# Patient Record
Sex: FEMALE | Race: WHITE | NOT HISPANIC OR LATINO | Employment: OTHER | URBAN - METROPOLITAN AREA
[De-identification: names, ages, dates, MRNs, and addresses within clinical notes are randomized per-mention and may not be internally consistent; named-entity substitution may affect disease eponyms.]

---

## 2017-05-31 ENCOUNTER — APPOINTMENT (OUTPATIENT)
Dept: LAB | Facility: HOSPITAL | Age: 62
End: 2017-05-31

## 2017-05-31 ENCOUNTER — OFFICE VISIT (OUTPATIENT)
Dept: NEUROLOGY | Facility: CLINIC | Age: 62
End: 2017-05-31

## 2017-05-31 VITALS
SYSTOLIC BLOOD PRESSURE: 136 MMHG | OXYGEN SATURATION: 98 % | WEIGHT: 149 LBS | BODY MASS INDEX: 27.42 KG/M2 | HEART RATE: 70 BPM | HEIGHT: 62 IN | DIASTOLIC BLOOD PRESSURE: 74 MMHG

## 2017-05-31 DIAGNOSIS — F41.9 ANXIETY: ICD-10-CM

## 2017-05-31 DIAGNOSIS — G35 MULTIPLE SCLEROSIS (HCC): Primary | ICD-10-CM

## 2017-05-31 LAB
ALBUMIN SERPL-MCNC: 4.5 G/DL (ref 3.2–4.8)
ALBUMIN/GLOB SERPL: 1.6 G/DL (ref 1.5–2.5)
ALP SERPL-CCNC: 70 U/L (ref 25–100)
ALT SERPL W P-5'-P-CCNC: 122 U/L (ref 7–40)
ANION GAP SERPL CALCULATED.3IONS-SCNC: 5 MMOL/L (ref 3–11)
AST SERPL-CCNC: 94 U/L (ref 0–33)
BASOPHILS # BLD AUTO: 0.01 10*3/MM3 (ref 0–0.2)
BASOPHILS NFR BLD AUTO: 0.2 % (ref 0–1)
BILIRUB SERPL-MCNC: 0.4 MG/DL (ref 0.3–1.2)
BUN BLD-MCNC: 11 MG/DL (ref 9–23)
BUN/CREAT SERPL: 18.3 (ref 7–25)
CALCIUM SPEC-SCNC: 10.4 MG/DL (ref 8.7–10.4)
CHLORIDE SERPL-SCNC: 100 MMOL/L (ref 99–109)
CO2 SERPL-SCNC: 35 MMOL/L (ref 20–31)
CREAT BLD-MCNC: 0.6 MG/DL (ref 0.6–1.3)
DEPRECATED RDW RBC AUTO: 47.3 FL (ref 37–54)
EOSINOPHIL # BLD AUTO: 0.12 10*3/MM3 (ref 0.1–0.3)
EOSINOPHIL NFR BLD AUTO: 2.8 % (ref 0–3)
ERYTHROCYTE [DISTWIDTH] IN BLOOD BY AUTOMATED COUNT: 13.8 % (ref 11.3–14.5)
GFR SERPL CREATININE-BSD FRML MDRD: 102 ML/MIN/1.73
GLOBULIN UR ELPH-MCNC: 2.9 GM/DL
GLUCOSE BLD-MCNC: 88 MG/DL (ref 70–100)
HCT VFR BLD AUTO: 39.2 % (ref 34.5–44)
HGB BLD-MCNC: 12.8 G/DL (ref 11.5–15.5)
IMM GRANULOCYTES # BLD: 0 10*3/MM3 (ref 0–0.03)
IMM GRANULOCYTES NFR BLD: 0 % (ref 0–0.6)
LYMPHOCYTES # BLD AUTO: 1.31 10*3/MM3 (ref 0.6–4.8)
LYMPHOCYTES NFR BLD AUTO: 30 % (ref 24–44)
MCH RBC QN AUTO: 30.7 PG (ref 27–31)
MCHC RBC AUTO-ENTMCNC: 32.7 G/DL (ref 32–36)
MCV RBC AUTO: 94 FL (ref 80–99)
MONOCYTES # BLD AUTO: 0.68 10*3/MM3 (ref 0–1)
MONOCYTES NFR BLD AUTO: 15.6 % (ref 0–12)
NEUTROPHILS # BLD AUTO: 2.24 10*3/MM3 (ref 1.5–8.3)
NEUTROPHILS NFR BLD AUTO: 51.4 % (ref 41–71)
PLATELET # BLD AUTO: 223 10*3/MM3 (ref 150–450)
PMV BLD AUTO: 10 FL (ref 6–12)
POTASSIUM BLD-SCNC: 4 MMOL/L (ref 3.5–5.5)
PROT SERPL-MCNC: 7.4 G/DL (ref 5.7–8.2)
RBC # BLD AUTO: 4.17 10*6/MM3 (ref 3.89–5.14)
SODIUM BLD-SCNC: 140 MMOL/L (ref 132–146)
TSH SERPL DL<=0.05 MIU/L-ACNC: 2.22 MIU/ML (ref 0.35–5.35)
WBC NRBC COR # BLD: 4.36 10*3/MM3 (ref 3.5–10.8)

## 2017-05-31 PROCEDURE — 99213 OFFICE O/P EST LOW 20 MIN: CPT | Performed by: PSYCHIATRY & NEUROLOGY

## 2017-05-31 PROCEDURE — 84443 ASSAY THYROID STIM HORMONE: CPT | Performed by: PSYCHIATRY & NEUROLOGY

## 2017-05-31 PROCEDURE — 80053 COMPREHEN METABOLIC PANEL: CPT | Performed by: PSYCHIATRY & NEUROLOGY

## 2017-05-31 PROCEDURE — 85025 COMPLETE CBC W/AUTO DIFF WBC: CPT | Performed by: PSYCHIATRY & NEUROLOGY

## 2017-05-31 PROCEDURE — 36415 COLL VENOUS BLD VENIPUNCTURE: CPT | Performed by: PSYCHIATRY & NEUROLOGY

## 2017-05-31 RX ORDER — LISINOPRIL AND HYDROCHLOROTHIAZIDE 25; 20 MG/1; MG/1
1 TABLET ORAL DAILY
COMMUNITY
Start: 2017-04-14 | End: 2017-06-07

## 2017-05-31 RX ORDER — LEVOTHYROXINE SODIUM 0.03 MG/1
1 TABLET ORAL DAILY
COMMUNITY
Start: 2017-04-28 | End: 2017-09-11

## 2017-05-31 RX ORDER — METOPROLOL SUCCINATE 50 MG/1
1 TABLET, EXTENDED RELEASE ORAL DAILY
COMMUNITY
Start: 2017-04-27

## 2017-06-07 ENCOUNTER — CONSULT (OUTPATIENT)
Dept: CARDIOLOGY | Facility: CLINIC | Age: 62
End: 2017-06-07

## 2017-06-07 VITALS
WEIGHT: 150.3 LBS | HEIGHT: 62 IN | SYSTOLIC BLOOD PRESSURE: 140 MMHG | DIASTOLIC BLOOD PRESSURE: 86 MMHG | HEART RATE: 75 BPM | BODY MASS INDEX: 27.66 KG/M2

## 2017-06-07 DIAGNOSIS — F41.9 ANXIETY: ICD-10-CM

## 2017-06-07 DIAGNOSIS — G35 MULTIPLE SCLEROSIS (HCC): ICD-10-CM

## 2017-06-07 DIAGNOSIS — I20.9 ANGINA PECTORIS (HCC): Primary | ICD-10-CM

## 2017-06-07 DIAGNOSIS — I10 ESSENTIAL HYPERTENSION: ICD-10-CM

## 2017-06-07 DIAGNOSIS — I49.3 PVC (PREMATURE VENTRICULAR CONTRACTION): ICD-10-CM

## 2017-06-07 DIAGNOSIS — Q21.12 PFO (PATENT FORAMEN OVALE): ICD-10-CM

## 2017-06-07 PROCEDURE — 93000 ELECTROCARDIOGRAM COMPLETE: CPT | Performed by: INTERNAL MEDICINE

## 2017-06-07 PROCEDURE — 99213 OFFICE O/P EST LOW 20 MIN: CPT | Performed by: INTERNAL MEDICINE

## 2017-06-07 RX ORDER — LISINOPRIL AND HYDROCHLOROTHIAZIDE 12.5; 1 MG/1; MG/1
1 TABLET ORAL DAILY
COMMUNITY
End: 2019-04-12 | Stop reason: SDUPTHER

## 2017-06-07 NOTE — PROGRESS NOTES
Subjective:     Encounter Date:2017      Patient ID: Jagruti Morgan is a 62 y.o. female.    Chief Complaint:Rapid Heart Rate (Consult); Irregular Heart Beat; Hypertension; and Dizziness    PROBLEM LIST:  1. PFO  2. PVC's  3. Hypertension  4. Dyslipidemia  5. Multiple sclerosis  6. Elevated LFT's  7. Hypothyroidism  8. Stroke left eye  9. Anxiety/depression  10. Surgeries:  a.  section        No Known Allergies      Current Outpatient Prescriptions:   •  aspirin 81 MG tablet, Take 81 mg by mouth Daily., Disp: , Rfl:   •  Calcium-Magnesium-Vitamin D (CALCIUM MAGNESIUM PO), Take  by mouth Daily., Disp: , Rfl:   •  levothyroxine (SYNTHROID, LEVOTHROID) 25 MCG tablet, Take 1 tablet by mouth Daily., Disp: , Rfl:   •  lisinopril-hydrochlorothiazide (PRINZIDE,ZESTORETIC) 10-12.5 MG per tablet, Take 1 tablet by mouth Daily., Disp: , Rfl:   •  metoprolol succinate XL (TOPROL-XL) 50 MG 24 hr tablet, Take 1 tablet by mouth Daily., Disp: , Rfl:   •  MILK THISTLE PO, Take  by mouth Daily., Disp: , Rfl:   •  Multiple Vitamins-Minerals (MULTIVITAMIN ADULTS 50+ PO), Take  by mouth Daily., Disp: , Rfl:   •  NIACIN PO, Take  by mouth Daily., Disp: , Rfl:   •  Omega-3 Fatty Acids (FISH OIL PO), Take  by mouth Daily., Disp: , Rfl:   •  pravastatin (PRAVACHOL) 40 MG tablet, Take 40 mg by mouth daily., Disp: , Rfl:         History of Present Illness    Patient is a 18-year-old  female who we are seeing today for further evaluation of palpitations.  Per her report she has a history of PFO, however she was unable to find records regarding this.  Notes that it was found on a [facility] whenever she had a stroke to her left eye a few years ago.  She is also over the last couple of months been having some palpitations.  She initially noted this when ever she was in [place].  Part onset had noted elevated blood pressure with associated nosebleed.  Contacted her primary care physician who increased her lisinopril.  She continued  "to have symptoms and then presented back to [place] for further evaluation.  She had a Holter monitor performed which showed PVCs that were associated with her symptoms.  She was then started on a higher dose of her metoprolol.  Since that time her symptoms have been improved but not resolved.  Her hard pounding sensation palpitations are now replaced by a \"flutters\".  Denies any chest pain, pressure, tightness.  Denies any increasing shortness of breath.  Denies any syncope, near-syncope.  Does have some occasional dizziness with her palpitations.  She also wonders if some of her dizziness is related to the increase in her medications.  She has not had an ischemic evaluation in roughly 5 years.  She was told by her physicians to \"take it easy\" and therefore has been too scared to exercise.  She recently had some abnormal lab results regarding her thyroid and liver test.  Therefore her extavia was discontinued.  Notes she has been fairly active recently and weight recently went golfing without any symptoms.  He was told she had PFO at [facility] many years ago.  Hematocrit.  Now with labile blood pressures anxiety.  Reduced.  She is taken off her Advair from for MS due to fear of her PVCs.  Exertional symptoms  The following portions of the patient's history were reviewed and updated as appropriate: allergies, current medications, past family history, past medical history, past social history, past surgical history and problem list.    Family History   Problem Relation Age of Onset   • Alzheimer's disease Mother    • Dementia Mother       from alziehmier's   • Diabetes Mother    • Hypertension Father    • Heart attack Father    • Stroke Other    • Parkinsonism Paternal Uncle      Dianoised at 80   • Stroke Maternal Aunt    • Cancer Brother      bladder cancer       Social History   Substance Use Topics   • Smoking status: Former Smoker   • Smokeless tobacco: Never Used      Comment: quit 23 years ago   • Alcohol use " "Yes      Comment: occas         Review of Systems   Constitution: Negative for fever, weakness and malaise/fatigue.   HENT: Negative for headaches and nosebleeds.    Eyes: Negative for redness and visual disturbance.   Cardiovascular: Negative for orthopnea, palpitations and paroxysmal nocturnal dyspnea.   Respiratory: Negative for cough, snoring, sputum production and wheezing.    Hematologic/Lymphatic: Negative for bleeding problem.   Skin: Negative for flushing, itching and rash.   Musculoskeletal: Negative for falls, joint pain and muscle cramps.   Gastrointestinal: Negative for abdominal pain, diarrhea, heartburn, nausea and vomiting.   Genitourinary: Negative for hematuria.   Neurological: Negative for excessive daytime sleepiness, dizziness and tremors.   Psychiatric/Behavioral: Positive for depression. Negative for substance abuse. The patient is nervous/anxious.           Objective:    height is 62\" (157.5 cm) and weight is 150 lb 4.8 oz (68.2 kg). Her blood pressure is 140/86 and her pulse is 75.         Physical Exam   Constitutional: She is oriented to person, place, and time. She appears well-developed and well-nourished.   HENT:   Head: Normocephalic and atraumatic.   Mouth/Throat: Oropharynx is clear and moist.   Eyes: Conjunctivae are normal. Pupils are equal, round, and reactive to light.   Neck: Normal carotid pulses and no JVD present. Carotid bruit is not present. No thyromegaly present.   Cardiovascular: Normal rate, regular rhythm, S1 normal and S2 normal.  Exam reveals no gallop and no friction rub.    No murmur heard.  Pulses:       Carotid pulses are 2+ on the right side, and 2+ on the left side.       Dorsalis pedis pulses are 2+ on the right side, and 2+ on the left side.        Posterior tibial pulses are 2+ on the right side, and 2+ on the left side.   Pulmonary/Chest: No respiratory distress. She has no wheezes. She has no rales. She exhibits no tenderness.   Abdominal: She exhibits no " distension, no abdominal bruit and no mass. There is no hepatosplenomegaly. There is no tenderness. There is no rebound.   Musculoskeletal: She exhibits no edema, tenderness or deformity.   Lymphadenopathy:     She has no cervical adenopathy.   Neurological: She is alert and oriented to person, place, and time. She has normal strength.   Skin: Skin is warm and dry. No rash noted. No cyanosis. Nails show no clubbing.   Psychiatric: She has a normal mood and affect. Cognition and memory are normal.         ECG 12 Lead  Date/Time: 6/7/2017 10:36 AM  Performed by: ROHIT WARNER  Authorized by: ROHIT WARNER   Rhythm: sinus rhythm  Clinical impression: normal ECG          Sinus rhythm normal limits        Assessment:   Assessment/Plan      Jagruti was seen today for rapid heart rate, irregular heart beat, hypertension and dizziness.    Diagnoses and all orders for this visit:    Angina pectoris  -     Adult Transthoracic Echo Complete; Future  -     Stress Test With Myocardial Perfusion; Future    PFO (patent foramen ovale)  -     Adult Transthoracic Echo Complete; Future    PVC (premature ventricular contraction)    Anxiety    Essential hypertension    Multiple sclerosis    Other orders  -     ECG 12 Lead      Labile hypertension likely anxiety reduced.  Now well controlled on current medical therapy  PVCs, plus minus symptomatic.  PFO asymptomatic    Discussed benign nature PVCs as long she is instruction normal heart.  Her last evaluation was 4 years ago therefore we'll repeat a stress perfusion study and an echocardiogram at her earliest convenience.  She understands if these tests are normal, and she has PVCs which showed normal heart, this is a benign condition.  There is no need to curtail her activities.  Proceed with any therapy for her MS as as needed, we'll simply treat her PVCs symptomatically.  At this time, her symptoms have improved enough, that if benign, she does not wish to have any further up  titration of her medical therapy.       Do ROSARIO scribed portions of this dictation for  Dr. yanna LALA, Lionel Ruiz MD, personally performed the services described in this documentation as scribed by the above individual in my presence, and it is both accurate and complete      Dictated utilizing Dragon dictation

## 2017-06-21 ENCOUNTER — HOSPITAL ENCOUNTER (OUTPATIENT)
Dept: CARDIOLOGY | Facility: HOSPITAL | Age: 62
Discharge: HOME | End: 2017-06-21
Attending: INTERNAL MEDICINE | Admitting: INTERNAL MEDICINE

## 2017-06-21 VITALS — BODY MASS INDEX: 27.6 KG/M2 | WEIGHT: 150 LBS | HEIGHT: 62 IN

## 2017-06-21 DIAGNOSIS — Q21.12 PFO (PATENT FORAMEN OVALE): ICD-10-CM

## 2017-06-21 DIAGNOSIS — I20.9 ANGINA PECTORIS (HCC): ICD-10-CM

## 2017-06-21 PROCEDURE — 93306 TTE W/DOPPLER COMPLETE: CPT | Performed by: INTERNAL MEDICINE

## 2017-06-21 PROCEDURE — 93306 TTE W/DOPPLER COMPLETE: CPT

## 2017-06-22 LAB
BH CV ECHO MEAS - AO ROOT AREA (BSA CORRECTED): 1.5
BH CV ECHO MEAS - AO ROOT AREA: 5.3 CM^2
BH CV ECHO MEAS - AO ROOT DIAM: 2.6 CM
BH CV ECHO MEAS - BSA(HAYCOCK): 1.7 M^2
BH CV ECHO MEAS - BSA: 1.7 M^2
BH CV ECHO MEAS - BZI_BMI: 27.4 KILOGRAMS/M^2
BH CV ECHO MEAS - BZI_METRIC_HEIGHT: 157.5 CM
BH CV ECHO MEAS - BZI_METRIC_WEIGHT: 68 KG
BH CV ECHO MEAS - CONTRAST EF (2CH): 67.8 ML/M^2
BH CV ECHO MEAS - CONTRAST EF 4CH: 65.6 ML/M^2
BH CV ECHO MEAS - EDV(CUBED): 54.9 ML
BH CV ECHO MEAS - EDV(MOD-SP2): 59 ML
BH CV ECHO MEAS - EDV(MOD-SP4): 64 ML
BH CV ECHO MEAS - EDV(TEICH): 62 ML
BH CV ECHO MEAS - EF(CUBED): 74.8 %
BH CV ECHO MEAS - EF(MOD-SP2): 67.8 %
BH CV ECHO MEAS - EF(MOD-SP4): 65.6 %
BH CV ECHO MEAS - EF(TEICH): 67.5 %
BH CV ECHO MEAS - ESV(CUBED): 13.8 ML
BH CV ECHO MEAS - ESV(MOD-SP2): 19 ML
BH CV ECHO MEAS - ESV(MOD-SP4): 22 ML
BH CV ECHO MEAS - ESV(TEICH): 20.2 ML
BH CV ECHO MEAS - FS: 36.8 %
BH CV ECHO MEAS - IVS/LVPW: 1
BH CV ECHO MEAS - IVSD: 1 CM
BH CV ECHO MEAS - LA DIMENSION: 3.5 CM
BH CV ECHO MEAS - LA/AO: 1.3
BH CV ECHO MEAS - LAT PEAK E' VEL: 12 CM/SEC
BH CV ECHO MEAS - LV DIASTOLIC VOL/BSA (35-75): 37.8 ML/M^2
BH CV ECHO MEAS - LV MASS(C)D: 117.3 GRAMS
BH CV ECHO MEAS - LV MASS(C)DI: 69.3 GRAMS/M^2
BH CV ECHO MEAS - LV SYSTOLIC VOL/BSA (12-30): 13 ML/M^2
BH CV ECHO MEAS - LVIDD: 3.8 CM
BH CV ECHO MEAS - LVIDS: 2.4 CM
BH CV ECHO MEAS - LVLD AP2: 6.7 CM
BH CV ECHO MEAS - LVLD AP4: 7.3 CM
BH CV ECHO MEAS - LVLS AP2: 5 CM
BH CV ECHO MEAS - LVLS AP4: 5.4 CM
BH CV ECHO MEAS - LVPWD: 1 CM
BH CV ECHO MEAS - MED PEAK E' VEL: 6.53 CM/SEC
BH CV ECHO MEAS - MV A MAX VEL: 73.3 CM/SEC
BH CV ECHO MEAS - MV DEC TIME: 0.22 SEC
BH CV ECHO MEAS - MV E MAX VEL: 77.1 CM/SEC
BH CV ECHO MEAS - MV E/A: 1.1
BH CV ECHO MEAS - PA ACC SLOPE: 385 CM/SEC^2
BH CV ECHO MEAS - PA ACC TIME: 0.14 SEC
BH CV ECHO MEAS - PA PR(ACCEL): 17.4 MMHG
BH CV ECHO MEAS - PI END-D VEL: 89 CM/SEC
BH CV ECHO MEAS - RAP SYSTOLE: 3 MMHG
BH CV ECHO MEAS - RVDD: 2.6 CM
BH CV ECHO MEAS - RVSP: 25.1 MMHG
BH CV ECHO MEAS - SI(CUBED): 24.3 ML/M^2
BH CV ECHO MEAS - SI(MOD-SP2): 23.6 ML/M^2
BH CV ECHO MEAS - SI(MOD-SP4): 24.8 ML/M^2
BH CV ECHO MEAS - SI(TEICH): 24.7 ML/M^2
BH CV ECHO MEAS - SV(CUBED): 41 ML
BH CV ECHO MEAS - SV(MOD-SP2): 40 ML
BH CV ECHO MEAS - SV(MOD-SP4): 42 ML
BH CV ECHO MEAS - SV(TEICH): 41.8 ML
BH CV ECHO MEAS - TAPSE (>1.6): 1.2 CM2
BH CV ECHO MEAS - TR MAX VEL: 235 CM/SEC
BH CV VAS BP RIGHT ARM: NORMAL MMHG
BH CV XLRA - RV BASE: 3.1 CM
BH CV XLRA - RV LENGTH: 6.1 CM
BH CV XLRA - RV MID: 2.4 CM
BH CV XLRA - TDI S': 13.4 CM/SEC
E/E' RATIO: 14.1
LEFT ATRIUM VOLUME INDEX: 26 ML/M2
LEFT ATRIUM VOLUME: 44 CM3
LV EF 2D ECHO EST: 60 %

## 2017-06-28 ENCOUNTER — OUTSIDE FACILITY SERVICE (OUTPATIENT)
Dept: CARDIOLOGY | Facility: CLINIC | Age: 62
End: 2017-06-28

## 2017-06-28 PROCEDURE — 93018 CV STRESS TEST I&R ONLY: CPT | Performed by: INTERNAL MEDICINE

## 2017-06-29 ENCOUNTER — TELEPHONE (OUTPATIENT)
Dept: CARDIOLOGY | Facility: CLINIC | Age: 62
End: 2017-06-29

## 2017-06-29 NOTE — TELEPHONE ENCOUNTER
----- Message from MARLENE Mendez sent at 6/28/2017  1:29 PM EDT -----  Please let patient know that her echo from last week was normal.

## 2017-07-31 ENCOUNTER — OFFICE VISIT (OUTPATIENT)
Dept: NEUROLOGY | Facility: CLINIC | Age: 62
End: 2017-07-31

## 2017-07-31 ENCOUNTER — LAB (OUTPATIENT)
Dept: LAB | Facility: HOSPITAL | Age: 62
End: 2017-07-31

## 2017-07-31 VITALS
SYSTOLIC BLOOD PRESSURE: 120 MMHG | HEIGHT: 62 IN | WEIGHT: 150.4 LBS | DIASTOLIC BLOOD PRESSURE: 80 MMHG | OXYGEN SATURATION: 99 % | HEART RATE: 77 BPM | BODY MASS INDEX: 27.68 KG/M2

## 2017-07-31 DIAGNOSIS — F33.41 RECURRENT MAJOR DEPRESSIVE DISORDER, IN PARTIAL REMISSION (HCC): ICD-10-CM

## 2017-07-31 DIAGNOSIS — G35 MULTIPLE SCLEROSIS (HCC): Primary | ICD-10-CM

## 2017-07-31 DIAGNOSIS — G35 MULTIPLE SCLEROSIS (HCC): ICD-10-CM

## 2017-07-31 LAB
ALBUMIN SERPL-MCNC: 4.8 G/DL (ref 3.2–4.8)
ALBUMIN/GLOB SERPL: 1.5 G/DL (ref 1.5–2.5)
ALP SERPL-CCNC: 71 U/L (ref 25–100)
ALT SERPL W P-5'-P-CCNC: 75 U/L (ref 7–40)
ANION GAP SERPL CALCULATED.3IONS-SCNC: 6 MMOL/L (ref 3–11)
AST SERPL-CCNC: 51 U/L (ref 0–33)
BASOPHILS # BLD AUTO: 0.03 10*3/MM3 (ref 0–0.2)
BASOPHILS NFR BLD AUTO: 0.7 % (ref 0–1)
BILIRUB SERPL-MCNC: 0.4 MG/DL (ref 0.3–1.2)
BUN BLD-MCNC: 12 MG/DL (ref 9–23)
BUN/CREAT SERPL: 20 (ref 7–25)
CALCIUM SPEC-SCNC: 10.7 MG/DL (ref 8.7–10.4)
CHLORIDE SERPL-SCNC: 102 MMOL/L (ref 99–109)
CO2 SERPL-SCNC: 32 MMOL/L (ref 20–31)
CREAT BLD-MCNC: 0.6 MG/DL (ref 0.6–1.3)
DEPRECATED RDW RBC AUTO: 45.2 FL (ref 37–54)
EOSINOPHIL # BLD AUTO: 0.14 10*3/MM3 (ref 0–0.3)
EOSINOPHIL NFR BLD AUTO: 3.1 % (ref 0–3)
ERYTHROCYTE [DISTWIDTH] IN BLOOD BY AUTOMATED COUNT: 13.1 % (ref 11.3–14.5)
GFR SERPL CREATININE-BSD FRML MDRD: 102 ML/MIN/1.73
GLOBULIN UR ELPH-MCNC: 3.1 GM/DL
GLUCOSE BLD-MCNC: 89 MG/DL (ref 70–100)
HCT VFR BLD AUTO: 42.2 % (ref 34.5–44)
HGB BLD-MCNC: 14.1 G/DL (ref 11.5–15.5)
IMM GRANULOCYTES # BLD: 0 10*3/MM3 (ref 0–0.03)
IMM GRANULOCYTES NFR BLD: 0 % (ref 0–0.6)
LYMPHOCYTES # BLD AUTO: 1.75 10*3/MM3 (ref 0.6–4.8)
LYMPHOCYTES NFR BLD AUTO: 38.2 % (ref 24–44)
MCH RBC QN AUTO: 31.3 PG (ref 27–31)
MCHC RBC AUTO-ENTMCNC: 33.4 G/DL (ref 32–36)
MCV RBC AUTO: 93.6 FL (ref 80–99)
MONOCYTES # BLD AUTO: 0.54 10*3/MM3 (ref 0–1)
MONOCYTES NFR BLD AUTO: 11.8 % (ref 0–12)
NEUTROPHILS # BLD AUTO: 2.12 10*3/MM3 (ref 1.5–8.3)
NEUTROPHILS NFR BLD AUTO: 46.2 % (ref 41–71)
PLATELET # BLD AUTO: 255 10*3/MM3 (ref 150–450)
PMV BLD AUTO: 9.3 FL (ref 6–12)
POTASSIUM BLD-SCNC: 4.4 MMOL/L (ref 3.5–5.5)
PROT SERPL-MCNC: 7.9 G/DL (ref 5.7–8.2)
RBC # BLD AUTO: 4.51 10*6/MM3 (ref 3.89–5.14)
SODIUM BLD-SCNC: 140 MMOL/L (ref 132–146)
TSH SERPL DL<=0.05 MIU/L-ACNC: 2.87 MIU/ML (ref 0.35–5.35)
WBC NRBC COR # BLD: 4.58 10*3/MM3 (ref 3.5–10.8)

## 2017-07-31 PROCEDURE — 85025 COMPLETE CBC W/AUTO DIFF WBC: CPT | Performed by: PSYCHIATRY & NEUROLOGY

## 2017-07-31 PROCEDURE — 80053 COMPREHEN METABOLIC PANEL: CPT | Performed by: PSYCHIATRY & NEUROLOGY

## 2017-07-31 PROCEDURE — 99214 OFFICE O/P EST MOD 30 MIN: CPT | Performed by: PSYCHIATRY & NEUROLOGY

## 2017-07-31 PROCEDURE — 36415 COLL VENOUS BLD VENIPUNCTURE: CPT

## 2017-07-31 PROCEDURE — 84443 ASSAY THYROID STIM HORMONE: CPT | Performed by: PSYCHIATRY & NEUROLOGY

## 2017-07-31 RX ORDER — DICLOFENAC SODIUM 75 MG/1
TABLET, DELAYED RELEASE ORAL
COMMUNITY
Start: 2017-07-28 | End: 2018-04-10 | Stop reason: HOSPADM

## 2017-07-31 RX ORDER — METHOCARBAMOL 500 MG/1
TABLET, FILM COATED ORAL
COMMUNITY
Start: 2017-07-28 | End: 2017-09-11

## 2017-07-31 NOTE — PROGRESS NOTES
"Subjective:     Patient ID: Jagruti Morgan is a 62 y.o. female.    History of Present Illness     62 y.o.  woman with RRMS, fatigue and MDD returns in follow up.  Last visit on 5/31/17 stopped Extavia and ordered labs.       5/31/17:  AST 94    CBC stable  TSH 2.22      Interval history:  Stopping Extavia has had improved fatigue and decreased anxiety.      MDD     Mood improved significantly off Extavia.  Does not bother her to go out in public as before it would.      RRMS    No new or worsening sx.   Fatigue is mild.  Heat intolerance is moderate.         The following portions of the patient's history were reviewed and updated as appropriate: allergies, current medications, past medical history, past surgical history and problem list.    Review of Systems   Constitutional: Positive for fatigue. Negative for activity change and unexpected weight change.   HENT: Negative for tinnitus and trouble swallowing.    Eyes: Negative for photophobia and visual disturbance.   Respiratory: Negative for apnea and choking.    Cardiovascular: Negative for leg swelling.   Endocrine: Positive for heat intolerance. Negative for cold intolerance.   Genitourinary: Negative for difficulty urinating, frequency, menstrual problem and urgency.   Musculoskeletal: Negative for back pain and gait problem.   Skin: Negative for color change.   Allergic/Immunologic: Negative for immunocompromised state.   Neurological: Negative for dizziness, tremors, seizures, syncope, facial asymmetry, speech difficulty and light-headedness.   Hematological: Negative for adenopathy. Does not bruise/bleed easily.   Psychiatric/Behavioral: Positive for decreased concentration and dysphoric mood. Negative for behavioral problems, confusion, hallucinations and sleep disturbance. The patient is nervous/anxious.         Objective:  Vitals:    07/31/17 1120   BP: 120/80   Pulse: 77   SpO2: 99%   Weight: 150 lb 6.4 oz (68.2 kg)   Height: 62\" (157.5 cm) "       Neurologic Exam     Mental Status   Oriented to person, place, and time.   Registration: recalls 3 of 3 objects. Recall at 5 minutes: recalls 3 of 3 objects. Follows 3 step commands.   Attention: normal. Concentration: normal.   Speech: speech is normal   Level of consciousness: alert  Knowledge: good and consistent with education.   Able to name object. Able to read. Able to repeat. Able to write. Normal comprehension.     Cranial Nerves     CN II   Visual fields full to confrontation.   Visual acuity: normal  Right visual field deficit: none  Left visual field deficit: none     CN III, IV, VI   Pupils are equal, round, and reactive to light.  Extraocular motions are normal.   Nystagmus: none   Diplopia: none  Ophthalmoparesis: none  Upgaze: normal  Downgaze: normal  Conjugate gaze: present  Vestibulo-ocular reflex: present    CN V   Facial sensation intact.   Right corneal reflex: normal  Left corneal reflex: normal    CN VII   Right facial weakness: none  Left facial weakness: none    CN VIII   Hearing: intact    CN IX, X   Palate: symmetric  Right gag reflex: normal  Left gag reflex: normal    CN XI   Right sternocleidomastoid strength: normal  Left sternocleidomastoid strength: normal    CN XII   Tongue: not atrophic  Fasciculations: absent  Tongue deviation: none    Motor Exam   Muscle bulk: normal  Overall muscle tone: normal  Right arm tone: normal  Left arm tone: normal  Right leg tone: normal  Left leg tone: normal    Strength   Strength 5/5 throughout.     Sensory Exam   Light touch normal.   Pinprick normal.     Gait, Coordination, and Reflexes     Coordination   Romberg: negative  Finger to nose coordination: normal  Heel to shin coordination: normal  Tandem walking coordination: normal    Tremor   Resting tremor: absent  Intention tremor: absent  Action tremor: absent    Reflexes   Reflexes 2+ except as noted.       Physical Exam   Constitutional: She is oriented to person, place, and time. She  appears well-developed and well-nourished.   Eyes: EOM are normal. Pupils are equal, round, and reactive to light.   Neurological: She is oriented to person, place, and time. She has normal strength. She has a normal Finger-Nose-Finger Test, a normal Heel to Shin Test, a normal Romberg Test and a normal Tandem Gait Test.   Psychiatric: Her speech is normal.   Nursing note and vitals reviewed.      Assessment/Plan:       Problems Addressed this Visit        Nervous and Auditory    Multiple sclerosis - Primary     Improved side effects off Extavia    Start copaxone    CBC, CMP, TSH          Relevant Orders    CBC & Differential    Comprehensive Metabolic Panel    TSH    MRI Brain With & Without Contrast       Other    Depression     Psychological condition is improving with treatment.  Continue current treatment regimen.  Psychological condition  will be reassessed at the next regular appointment.

## 2017-08-09 ENCOUNTER — HOSPITAL ENCOUNTER (OUTPATIENT)
Dept: MRI IMAGING | Facility: HOSPITAL | Age: 62
Discharge: HOME | End: 2017-08-09
Attending: PSYCHIATRY & NEUROLOGY | Admitting: PSYCHIATRY & NEUROLOGY

## 2017-08-09 DIAGNOSIS — G35 MULTIPLE SCLEROSIS (HCC): ICD-10-CM

## 2017-08-09 PROCEDURE — 0 GADOBENATE DIMEGLUMINE 529 MG/ML SOLUTION: Performed by: PSYCHIATRY & NEUROLOGY

## 2017-08-09 PROCEDURE — A9577 INJ MULTIHANCE: HCPCS | Performed by: PSYCHIATRY & NEUROLOGY

## 2017-08-09 PROCEDURE — 70553 MRI BRAIN STEM W/O & W/DYE: CPT

## 2017-08-09 RX ADMIN — GADOBENATE DIMEGLUMINE 13 ML: 529 INJECTION, SOLUTION INTRAVENOUS at 11:30

## 2017-08-24 ENCOUNTER — TELEPHONE (OUTPATIENT)
Dept: NEUROLOGY | Facility: CLINIC | Age: 62
End: 2017-08-24

## 2017-08-24 NOTE — TELEPHONE ENCOUNTER
Adv patient to stop copraxtone per Dr. Dalton and see if symptoms decrease. Adv pateint to give us a call once she is feeling better . Allow a few days  patient understood

## 2017-08-28 ENCOUNTER — TELEPHONE (OUTPATIENT)
Dept: NEUROLOGY | Facility: CLINIC | Age: 62
End: 2017-08-28

## 2017-08-28 NOTE — TELEPHONE ENCOUNTER
----- Message from Taylor STERLING Hailee sent at 8/25/2017  4:29 PM EDT -----  Regarding: restart copaxone  Contact: [phone]  Patient states she is unclear on when to restart copaxone. She has a UTI and she is taking antibiotics.

## 2017-08-28 NOTE — TELEPHONE ENCOUNTER
Called pt and adv her to restart Copaxone and if symptoms return give us a call back. Pt stated understanding

## 2017-09-01 ENCOUNTER — TELEPHONE (OUTPATIENT)
Dept: NEUROLOGY | Facility: CLINIC | Age: 62
End: 2017-09-01

## 2017-09-01 NOTE — TELEPHONE ENCOUNTER
----- Message from Taylor Stephen sent at 9/1/2017 10:24 AM EDT -----  Regarding: BLURRY VISION  Contact: [phone]  Patient states she is having blurry vision and seeing prisms. This started 30 minutes ago.

## 2017-09-11 ENCOUNTER — OFFICE VISIT (OUTPATIENT)
Dept: NEUROLOGY | Facility: CLINIC | Age: 62
End: 2017-09-11

## 2017-09-11 VITALS
OXYGEN SATURATION: 98 % | WEIGHT: 151 LBS | BODY MASS INDEX: 27.79 KG/M2 | SYSTOLIC BLOOD PRESSURE: 124 MMHG | HEIGHT: 62 IN | DIASTOLIC BLOOD PRESSURE: 76 MMHG | HEART RATE: 70 BPM

## 2017-09-11 DIAGNOSIS — F41.9 ANXIETY: ICD-10-CM

## 2017-09-11 DIAGNOSIS — G35 MULTIPLE SCLEROSIS (HCC): Primary | ICD-10-CM

## 2017-09-11 PROCEDURE — 99213 OFFICE O/P EST LOW 20 MIN: CPT | Performed by: PSYCHIATRY & NEUROLOGY

## 2017-09-11 RX ORDER — GLATIRAMER ACETATE 40 MG/ML
INJECTION, SOLUTION SUBCUTANEOUS
COMMUNITY
Start: 2017-08-02 | End: 2018-06-15 | Stop reason: SDUPTHER

## 2017-09-11 RX ORDER — LEVOTHYROXINE SODIUM 0.05 MG/1
TABLET ORAL
COMMUNITY
Start: 2017-08-28 | End: 2020-06-22 | Stop reason: DRUGHIGH

## 2017-09-11 NOTE — PROGRESS NOTES
Subjective:     Patient ID: Jagruti Morgan is a 62 y.o. female.    History of Present Illness     62 y.o.  woman with RRMS, fatigue and MDD returns in follow up.  Last visit on 7/31/17 started Copaxone, ordered MRI Brain, labs.       7/31/17:  AST 51; ALT 75; CBC stable  TSH 2.874    MRI Brain, my review of films, 8/9/17 compared to 1/15/16 moderate to extensive T2 lesion load without new/enhancing/enalrging lesions    MDD     Mood is stable.  Some anxiety about effectiveness of copaxone.      RRMS    Started on Copaxone and some injection site pain.  No new or worsening sx.   Fatigue is mild.  Heat intolerance is moderate.         The following portions of the patient's history were reviewed and updated as appropriate: allergies, current medications, past medical history, past surgical history and problem list.    Review of Systems   Constitutional: Positive for fatigue. Negative for activity change and unexpected weight change.   HENT: Negative for tinnitus and trouble swallowing.    Eyes: Negative for photophobia and visual disturbance.   Respiratory: Negative for apnea and choking.    Cardiovascular: Negative for leg swelling.   Endocrine: Positive for heat intolerance. Negative for cold intolerance.   Genitourinary: Negative for difficulty urinating, frequency, menstrual problem and urgency.   Musculoskeletal: Negative for back pain and gait problem.   Skin: Negative for color change.   Allergic/Immunologic: Negative for immunocompromised state.   Neurological: Negative for dizziness, tremors, seizures, syncope, facial asymmetry, speech difficulty and light-headedness.   Hematological: Negative for adenopathy. Does not bruise/bleed easily.   Psychiatric/Behavioral: Positive for decreased concentration and dysphoric mood. Negative for behavioral problems, confusion, hallucinations and sleep disturbance. The patient is nervous/anxious.         Objective:  Vitals:    09/11/17 1143   BP: 124/76   Pulse: 70  "  SpO2: 98%   Weight: 151 lb (68.5 kg)   Height: 62\" (157.5 cm)       Neurologic Exam     Mental Status   Oriented to person, place, and time.   Registration: recalls 3 of 3 objects. Recall at 5 minutes: recalls 3 of 3 objects. Follows 3 step commands.   Attention: normal. Concentration: normal.   Speech: speech is normal   Level of consciousness: alert  Knowledge: good and consistent with education.   Able to name object. Able to read. Able to repeat. Able to write. Normal comprehension.     Cranial Nerves     CN II   Visual fields full to confrontation.   Visual acuity: normal  Right visual field deficit: none  Left visual field deficit: none     CN III, IV, VI   Pupils are equal, round, and reactive to light.  Extraocular motions are normal.   Nystagmus: none   Diplopia: none  Ophthalmoparesis: none  Upgaze: normal  Downgaze: normal  Conjugate gaze: present  Vestibulo-ocular reflex: present    CN V   Facial sensation intact.   Right corneal reflex: normal  Left corneal reflex: normal    CN VII   Right facial weakness: none  Left facial weakness: none    CN VIII   Hearing: intact    CN IX, X   Palate: symmetric  Right gag reflex: normal  Left gag reflex: normal    CN XI   Right sternocleidomastoid strength: normal  Left sternocleidomastoid strength: normal    CN XII   Tongue: not atrophic  Fasciculations: absent  Tongue deviation: none    Motor Exam   Muscle bulk: normal  Overall muscle tone: normal  Right arm tone: normal  Left arm tone: normal  Right leg tone: normal  Left leg tone: normal    Strength   Strength 5/5 throughout.     Sensory Exam   Light touch normal.   Pinprick normal.     Gait, Coordination, and Reflexes     Coordination   Romberg: negative  Finger to nose coordination: normal  Heel to shin coordination: normal  Tandem walking coordination: normal    Tremor   Resting tremor: absent  Intention tremor: absent  Action tremor: absent    Reflexes   Reflexes 2+ except as noted.       Physical Exam "   Constitutional: She is oriented to person, place, and time. She appears well-developed and well-nourished.   Eyes: EOM are normal. Pupils are equal, round, and reactive to light.   Neurological: She is oriented to person, place, and time. She has normal strength. She has a normal Finger-Nose-Finger Test, a normal Heel to Shin Test, a normal Romberg Test and a normal Tandem Gait Test.   Psychiatric: Her speech is normal.   Nursing note and vitals reviewed.      Assessment/Plan:       Problems Addressed this Visit        Nervous and Auditory    Multiple sclerosis - Primary     Switched to Copaxone with some injection site discomfort  LFT's dereasing              Other    Anxiety     Wishes to stay off medication

## 2018-02-23 ENCOUNTER — TELEPHONE (OUTPATIENT)
Dept: NEUROLOGY | Facility: CLINIC | Age: 63
End: 2018-02-23

## 2018-02-23 NOTE — TELEPHONE ENCOUNTER
Patient called, stated that she gets a bout of dizziness and then shortly after that she has diarrhea. Patient stated that she also gets a severe headache when this happens. Wanted to know if you though it was meniere's disease and what she should do about it. Please Advise. Thanks!!

## 2018-03-20 ENCOUNTER — OFFICE VISIT (OUTPATIENT)
Dept: NEUROLOGY | Facility: CLINIC | Age: 63
End: 2018-03-20

## 2018-03-20 VITALS
OXYGEN SATURATION: 99 % | RESPIRATION RATE: 16 BRPM | DIASTOLIC BLOOD PRESSURE: 88 MMHG | WEIGHT: 145.6 LBS | BODY MASS INDEX: 26.79 KG/M2 | HEART RATE: 78 BPM | HEIGHT: 62 IN | SYSTOLIC BLOOD PRESSURE: 152 MMHG

## 2018-03-20 DIAGNOSIS — F33.41 RECURRENT MAJOR DEPRESSIVE DISORDER, IN PARTIAL REMISSION (HCC): ICD-10-CM

## 2018-03-20 DIAGNOSIS — G35 MULTIPLE SCLEROSIS (HCC): Primary | ICD-10-CM

## 2018-03-20 PROCEDURE — 99213 OFFICE O/P EST LOW 20 MIN: CPT | Performed by: PSYCHIATRY & NEUROLOGY

## 2018-03-20 NOTE — PROGRESS NOTES
Subjective:   Chief Complaint   Patient presents with   • Follow-up     MS       Patient ID: Jagruti Morgan is a 63 y.o. female.    History of Present Illness     63 y.o.  woman with RRMS, fatigue and MDD returns in follow up.  Last visit on 9/11/17 continued Copaxone..          MRI Brain, 8/9/17 compared to 1/15/16 moderate to extensive T2 lesion load without new/enhancing/enalrging lesions    MDD     Mood is stable.  Anxiety improved slightly with change to GA.     RRMS    Last 3 weeks developed pressure/pain in ears.  Ears fell congested.  When arising feels unsteady.  Sinuses feel congested.     Tolerating Copaxone with mild injection site pain for last 7 months.  No new or worsening sx.   Fatigue is mild.  Heat intolerance is moderate.  Exercising daily.         The following portions of the patient's history were reviewed and updated as appropriate: allergies, current medications, past medical history, past surgical history and problem list.    Review of Systems   Constitutional: Positive for fatigue. Negative for activity change and unexpected weight change.   HENT: Negative for tinnitus and trouble swallowing.    Eyes: Negative for photophobia and visual disturbance.   Respiratory: Negative for apnea and choking.    Cardiovascular: Negative for leg swelling.   Endocrine: Positive for heat intolerance. Negative for cold intolerance.   Genitourinary: Negative for difficulty urinating, frequency, menstrual problem and urgency.   Musculoskeletal: Negative for back pain and gait problem.   Skin: Negative for color change.   Allergic/Immunologic: Negative for immunocompromised state.   Neurological: Negative for dizziness, tremors, seizures, syncope, facial asymmetry, speech difficulty and light-headedness.   Hematological: Negative for adenopathy. Does not bruise/bleed easily.   Psychiatric/Behavioral: Positive for decreased concentration and dysphoric mood. Negative for behavioral problems, confusion,  "hallucinations and sleep disturbance. The patient is nervous/anxious.         Objective:  Vitals:    03/20/18 1305   BP: 152/88   Pulse: 78   Resp: 16   SpO2: 99%   Weight: 66 kg (145 lb 9.6 oz)   Height: 157.5 cm (62\")       Neurologic Exam     Mental Status   Oriented to person, place, and time.   Registration: recalls 3 of 3 objects. Recall at 5 minutes: recalls 3 of 3 objects. Follows 3 step commands.   Attention: normal. Concentration: normal.   Speech: speech is normal   Level of consciousness: alert  Knowledge: good and consistent with education.   Able to name object. Able to read. Able to repeat. Able to write. Normal comprehension.     Cranial Nerves     CN II   Visual fields full to confrontation.   Visual acuity: normal  Right visual field deficit: none  Left visual field deficit: none     CN III, IV, VI   Pupils are equal, round, and reactive to light.  Extraocular motions are normal.   Nystagmus: none   Diplopia: none  Ophthalmoparesis: none  Upgaze: normal  Downgaze: normal  Conjugate gaze: present  Vestibulo-ocular reflex: present    CN V   Facial sensation intact.   Right corneal reflex: normal  Left corneal reflex: normal    CN VII   Right facial weakness: none  Left facial weakness: none    CN VIII   Hearing: intact    CN IX, X   Palate: symmetric  Right gag reflex: normal  Left gag reflex: normal    CN XI   Right sternocleidomastoid strength: normal  Left sternocleidomastoid strength: normal    CN XII   Tongue: not atrophic  Fasciculations: absent  Tongue deviation: none    Motor Exam   Muscle bulk: normal  Overall muscle tone: normal  Right arm tone: normal  Left arm tone: normal  Right leg tone: normal  Left leg tone: normal    Strength   Strength 5/5 throughout.     Sensory Exam   Light touch normal.   Pinprick normal.     Gait, Coordination, and Reflexes     Coordination   Romberg: negative  Finger to nose coordination: normal  Heel to shin coordination: normal  Tandem walking coordination: " normal    Tremor   Resting tremor: absent  Intention tremor: absent  Action tremor: absent    Reflexes   Reflexes 2+ except as noted.       Physical Exam   Constitutional: She is oriented to person, place, and time. She appears well-developed and well-nourished.   Eyes: EOM are normal. Pupils are equal, round, and reactive to light.   Neurological: She is oriented to person, place, and time. She has normal strength. She has a normal Finger-Nose-Finger Test, a normal Heel to Shin Test, a normal Romberg Test and a normal Tandem Gait Test.   Psychiatric: Her speech is normal.   Nursing note and vitals reviewed.      Assessment/Plan:       Problems Addressed this Visit        Nervous and Auditory    Multiple sclerosis - Primary     Check MRI Brain 6 months after starting new medication    Continue Copaxone          Relevant Orders    MRI Brain With & Without Contrast       Other    Depression     Psychological condition is improving with lifestyle modifications.  Continue current treatment regimen.  Psychological condition  will be reassessed at the next regular appointment.           Other Visit Diagnoses    None.

## 2018-03-28 ENCOUNTER — HOSPITAL ENCOUNTER (OUTPATIENT)
Dept: MRI IMAGING | Facility: HOSPITAL | Age: 63
Discharge: HOME | End: 2018-03-28
Attending: PSYCHIATRY & NEUROLOGY | Admitting: PSYCHIATRY & NEUROLOGY

## 2018-03-28 DIAGNOSIS — G35 MULTIPLE SCLEROSIS (HCC): ICD-10-CM

## 2018-03-28 LAB — CREAT BLDA-MCNC: 0.6 MG/DL (ref 0.6–1.3)

## 2018-03-28 PROCEDURE — 70553 MRI BRAIN STEM W/O & W/DYE: CPT

## 2018-03-28 PROCEDURE — 0 GADOBENATE DIMEGLUMINE 529 MG/ML SOLUTION: Performed by: PSYCHIATRY & NEUROLOGY

## 2018-03-28 PROCEDURE — 82565 ASSAY OF CREATININE: CPT

## 2018-03-28 PROCEDURE — A9577 INJ MULTIHANCE: HCPCS | Performed by: PSYCHIATRY & NEUROLOGY

## 2018-03-28 RX ADMIN — GADOBENATE DIMEGLUMINE 14 ML: 529 INJECTION, SOLUTION INTRAVENOUS at 09:12

## 2018-04-10 ENCOUNTER — OFFICE VISIT (OUTPATIENT)
Dept: NEUROLOGY | Facility: CLINIC | Age: 63
End: 2018-04-10

## 2018-04-10 VITALS
RESPIRATION RATE: 16 BRPM | HEART RATE: 69 BPM | SYSTOLIC BLOOD PRESSURE: 122 MMHG | HEIGHT: 62 IN | WEIGHT: 152 LBS | OXYGEN SATURATION: 98 % | DIASTOLIC BLOOD PRESSURE: 84 MMHG | BODY MASS INDEX: 27.97 KG/M2

## 2018-04-10 DIAGNOSIS — G35 MULTIPLE SCLEROSIS (HCC): ICD-10-CM

## 2018-04-10 DIAGNOSIS — H81.03 MENIERE'S DISEASE OF BOTH EARS: Primary | ICD-10-CM

## 2018-04-10 DIAGNOSIS — F33.41 RECURRENT MAJOR DEPRESSIVE DISORDER, IN PARTIAL REMISSION (HCC): ICD-10-CM

## 2018-04-10 PROCEDURE — 99213 OFFICE O/P EST LOW 20 MIN: CPT | Performed by: PSYCHIATRY & NEUROLOGY

## 2018-04-10 RX ORDER — IPRATROPIUM BROMIDE 21 UG/1
SPRAY, METERED NASAL
COMMUNITY
Start: 2018-03-29 | End: 2022-05-10 | Stop reason: SDUPTHER

## 2018-04-10 NOTE — PROGRESS NOTES
Subjective:   Chief Complaint   Patient presents with   • Follow-up     MRI Results      RRMS    Patient ID: Jagruti Morgan is a 63 y.o. female.    History of Present Illness     63 y.o.  woman with RRMS, fatigue and MDD returns in follow up.  Last visit on 9/11/17 continued Copaxone..          MRI Brain, 8/9/17 compared to 1/15/16 moderate to extensive T2 lesion load without new/enhancing/enalrging lesions    MDD     Mood is stable.  Anxiety improved slightly with change to GA.     RRMS    Recently diagnosed with Meniere's disease and started on low salt diet.  HA frequency is daily.  Located in top of head.  Quality dull ache.  Mild - moderate intensity.  Sense of of unsteadiness.  Low salt diet improving sx.     Tolerating Copaxone with mild injection pain.  No new or worsening sx.   Fatigue is mild.  Heat intolerance is moderate.  Exercising daily.         The following portions of the patient's history were reviewed and updated as appropriate: allergies, current medications, past medical history, past surgical history and problem list.    Review of Systems   Constitutional: Positive for fatigue. Negative for activity change and unexpected weight change.   HENT: Negative for tinnitus and trouble swallowing.    Eyes: Negative for photophobia and visual disturbance.   Respiratory: Negative for apnea and choking.    Cardiovascular: Negative for leg swelling.   Endocrine: Positive for heat intolerance. Negative for cold intolerance.   Genitourinary: Negative for difficulty urinating, frequency, menstrual problem and urgency.   Musculoskeletal: Negative for back pain and gait problem.   Skin: Negative for color change.   Allergic/Immunologic: Negative for immunocompromised state.   Neurological: Negative for dizziness, tremors, seizures, syncope, facial asymmetry, speech difficulty and light-headedness.   Hematological: Negative for adenopathy. Does not bruise/bleed easily.   Psychiatric/Behavioral: Positive for  "decreased concentration and dysphoric mood. Negative for behavioral problems, confusion, hallucinations and sleep disturbance. The patient is nervous/anxious.         Objective:  Vitals:    04/10/18 1012   BP: 122/84   Pulse: 69   Resp: 16   SpO2: 98%   Weight: 68.9 kg (152 lb)   Height: 157.5 cm (62\")       Neurologic Exam     Mental Status   Oriented to person, place, and time.   Registration: recalls 3 of 3 objects. Recall at 5 minutes: recalls 3 of 3 objects. Follows 3 step commands.   Attention: normal. Concentration: normal.   Speech: speech is normal   Level of consciousness: alert  Knowledge: good and consistent with education.   Able to name object. Able to read. Able to repeat. Able to write. Normal comprehension.     Cranial Nerves     CN II   Visual fields full to confrontation.   Visual acuity: normal  Right visual field deficit: none  Left visual field deficit: none     CN III, IV, VI   Pupils are equal, round, and reactive to light.  Extraocular motions are normal.   Nystagmus: none   Diplopia: none  Ophthalmoparesis: none  Upgaze: normal  Downgaze: normal  Conjugate gaze: present  Vestibulo-ocular reflex: present    CN V   Facial sensation intact.   Right corneal reflex: normal  Left corneal reflex: normal    CN VII   Right facial weakness: none  Left facial weakness: none    CN VIII   Hearing: intact    CN IX, X   Palate: symmetric  Right gag reflex: normal  Left gag reflex: normal    CN XI   Right sternocleidomastoid strength: normal  Left sternocleidomastoid strength: normal    CN XII   Tongue: not atrophic  Fasciculations: absent  Tongue deviation: none    Motor Exam   Muscle bulk: normal  Overall muscle tone: normal  Right arm tone: normal  Left arm tone: normal  Right leg tone: normal  Left leg tone: normal    Strength   Strength 5/5 throughout.     Sensory Exam   Light touch normal.   Pinprick normal.     Gait, Coordination, and Reflexes     Coordination   Romberg: negative  Finger to nose " coordination: normal  Heel to shin coordination: normal  Tandem walking coordination: normal    Tremor   Resting tremor: absent  Intention tremor: absent  Action tremor: absent    Reflexes   Reflexes 2+ except as noted.       Physical Exam   Constitutional: She is oriented to person, place, and time. She appears well-developed and well-nourished.   Eyes: EOM are normal. Pupils are equal, round, and reactive to light.   Neurological: She is oriented to person, place, and time. She has normal strength. She has a normal Finger-Nose-Finger Test, a normal Heel to Shin Test, a normal Romberg Test and a normal Tandem Gait Test.   Psychiatric: Her speech is normal.   Nursing note and vitals reviewed.      Assessment/Plan:       Problems Addressed this Visit        Nervous and Auditory    Multiple sclerosis     MRI Brain is stable     Continue Copaxone.             Other    Depression     Psychological condition is unchanged.  Continue current treatment regimen.  Psychological condition  will be reassessed at the next regular appointment.           Other Visit Diagnoses     Meniere's disease of both ears    -  Primary    Relevant Orders    Ambulatory Referral to ENT (Otolaryngology)

## 2018-06-15 RX ORDER — GLATIRAMER ACETATE 40 MG/ML
INJECTION, SOLUTION SUBCUTANEOUS
Qty: 36 ML | Refills: 3 | Status: SHIPPED | OUTPATIENT
Start: 2018-06-15 | End: 2018-09-14

## 2018-09-14 ENCOUNTER — TELEPHONE (OUTPATIENT)
Dept: NEUROLOGY | Facility: CLINIC | Age: 63
End: 2018-09-14

## 2018-09-14 RX ORDER — GLATIRAMER 40 MG/ML
40 INJECTION, SOLUTION SUBCUTANEOUS 3 TIMES WEEKLY
Qty: 12 SYRINGE | Refills: 11 | Status: SHIPPED | OUTPATIENT
Start: 2018-09-14 | End: 2018-09-17 | Stop reason: SDUPTHER

## 2018-09-14 NOTE — TELEPHONE ENCOUNTER
Called [facility], they would not take a verbal over the phone for the glatiramer 40mg/mL script. Will you please send in the start script for the patient to [facility]. Thanks!!

## 2018-09-17 ENCOUNTER — TELEPHONE (OUTPATIENT)
Dept: NEUROLOGY | Facility: CLINIC | Age: 63
End: 2018-09-17

## 2018-09-17 RX ORDER — GLATIRAMER 40 MG/ML
40 INJECTION, SOLUTION SUBCUTANEOUS 3 TIMES WEEKLY
Qty: 36 SYRINGE | Refills: 3 | Status: SHIPPED | OUTPATIENT
Start: 2018-09-17 | End: 2019-01-14

## 2018-09-17 NOTE — TELEPHONE ENCOUNTER
Sent in 90 day supply as requested to specialty pharmacy. Per the provider previous instructions.

## 2018-09-17 NOTE — TELEPHONE ENCOUNTER
----- Message from Taylor Stephen sent at 9/17/2018  2:45 PM EDT -----  Regarding: MEDICATION  Contact: [phone]  Patient request 90 day supply of glatiramer acetate 40 mg

## 2018-10-08 ENCOUNTER — OFFICE VISIT (OUTPATIENT)
Dept: NEUROLOGY | Facility: CLINIC | Age: 63
End: 2018-10-08

## 2018-10-08 VITALS
RESPIRATION RATE: 16 BRPM | OXYGEN SATURATION: 98 % | BODY MASS INDEX: 28.16 KG/M2 | DIASTOLIC BLOOD PRESSURE: 84 MMHG | SYSTOLIC BLOOD PRESSURE: 110 MMHG | HEIGHT: 62 IN | WEIGHT: 153 LBS | HEART RATE: 63 BPM

## 2018-10-08 DIAGNOSIS — F33.41 RECURRENT MAJOR DEPRESSIVE DISORDER, IN PARTIAL REMISSION (HCC): ICD-10-CM

## 2018-10-08 DIAGNOSIS — G35 MULTIPLE SCLEROSIS (HCC): Primary | ICD-10-CM

## 2018-10-08 DIAGNOSIS — G43.C0 PERIODIC HEADACHE SYNDROME, NOT INTRACTABLE: ICD-10-CM

## 2018-10-08 PROCEDURE — 99214 OFFICE O/P EST MOD 30 MIN: CPT | Performed by: PSYCHIATRY & NEUROLOGY

## 2018-10-08 NOTE — PROGRESS NOTES
Subjective:   Chief Complaint   Patient presents with   • Multiple Sclerosis     RRMS    Patient ID: Jagruti Morgan is a 63 y.o. female.    History of Present Illness     63 y.o.  woman with RRMS, fatigue and MDD returns in follow up.  Last visit on 4/10/18 continued Copaxone and referred to ENT.          MRI Brain, 8/9/17 compared to 1/15/16 moderate to extensive T2 lesion load without new/enhancing/enalrging lesions    MDD     Mood is stable.  Anxiety improved slightly with change to GA.     Migraines    Evaluated by Dr Treadwell and treated for allergies.  No further HA or dizziness.   Located in top of head.  Quality dull ache.  Mild - moderate intensity.  Sense of of unsteadiness.  Low salt diet improving sx.     RRMS    Tolerating Copaxone with minimal skin reactions.  Trouble finding injection sites due to skin sclerosis.  No new or worsening sx.   Fatigue is mild.  Heat intolerance causes fatigue.  Exercising daily.         The following portions of the patient's history were reviewed and updated as appropriate: allergies, current medications, past medical history, past surgical history and problem list.    Review of Systems   Constitutional: Positive for fatigue. Negative for activity change and unexpected weight change.   HENT: Negative for tinnitus and trouble swallowing.    Eyes: Negative for photophobia and visual disturbance.   Respiratory: Negative for apnea and choking.    Cardiovascular: Negative for leg swelling.   Endocrine: Positive for heat intolerance. Negative for cold intolerance.   Genitourinary: Negative for difficulty urinating, frequency, menstrual problem and urgency.   Musculoskeletal: Negative for back pain and gait problem.   Skin: Negative for color change.   Allergic/Immunologic: Negative for immunocompromised state.   Neurological: Negative for dizziness, tremors, seizures, syncope, facial asymmetry, speech difficulty and light-headedness.   Hematological: Negative for adenopathy.  "Does not bruise/bleed easily.   Psychiatric/Behavioral: Positive for decreased concentration and dysphoric mood. Negative for behavioral problems, confusion, hallucinations and sleep disturbance. The patient is nervous/anxious.         Objective:  Vitals:    10/08/18 1010   BP: 110/84   BP Location: Right arm   Patient Position: Sitting   Cuff Size: Adult   Pulse: 63   Resp: 16   SpO2: 98%   Weight: 69.4 kg (153 lb)   Height: 157.5 cm (62\")       Neurologic Exam     Mental Status   Oriented to person, place, and time.   Registration: recalls 3 of 3 objects. Recall at 5 minutes: recalls 3 of 3 objects. Follows 3 step commands.   Attention: normal. Concentration: normal.   Speech: speech is normal   Level of consciousness: alert  Knowledge: good and consistent with education.   Able to name object. Able to read. Able to repeat. Able to write. Normal comprehension.     Cranial Nerves     CN II   Visual fields full to confrontation.   Visual acuity: normal  Right visual field deficit: none  Left visual field deficit: none     CN III, IV, VI   Pupils are equal, round, and reactive to light.  Extraocular motions are normal.   Nystagmus: none   Diplopia: none  Ophthalmoparesis: none  Upgaze: normal  Downgaze: normal  Conjugate gaze: present  Vestibulo-ocular reflex: present    CN V   Facial sensation intact.   Right corneal reflex: normal  Left corneal reflex: normal    CN VII   Right facial weakness: none  Left facial weakness: none    CN VIII   Hearing: intact    CN IX, X   Palate: symmetric  Right gag reflex: normal  Left gag reflex: normal    CN XI   Right sternocleidomastoid strength: normal  Left sternocleidomastoid strength: normal    CN XII   Tongue: not atrophic  Fasciculations: absent  Tongue deviation: none    Motor Exam   Muscle bulk: normal  Overall muscle tone: normal  Right arm tone: normal  Left arm tone: normal  Right leg tone: normal  Left leg tone: normal    Strength   Strength 5/5 throughout. "     Sensory Exam   Light touch normal.   Pinprick normal.     Gait, Coordination, and Reflexes     Coordination   Romberg: negative  Finger to nose coordination: normal  Heel to shin coordination: normal  Tandem walking coordination: normal    Tremor   Resting tremor: absent  Intention tremor: absent  Action tremor: absent    Reflexes   Reflexes 2+ except as noted.       Physical Exam   Constitutional: She is oriented to person, place, and time. She appears well-developed and well-nourished.   Eyes: Pupils are equal, round, and reactive to light. EOM are normal.   Neurological: She is oriented to person, place, and time. She has normal strength. She has a normal Finger-Nose-Finger Test, a normal Heel to Shin Test, a normal Romberg Test and a normal Tandem Gait Test.   Psychiatric: Her speech is normal.   Nursing note and vitals reviewed.      Assessment/Plan:       Problems Addressed this Visit        Cardiovascular and Mediastinum    Periodic headache syndrome, not intractable     Headaches are unchanged.  Continue current treatment regimen.                Nervous and Auditory    Multiple sclerosis (CMS/HCC) - Primary     Sx stable on GA                Other    Depression     Psychological condition is unchanged.  Continue current treatment regimen.  Psychological condition  will be reassessed at the next regular appointment.

## 2018-11-14 ENCOUNTER — TRANSCRIBE ORDERS (OUTPATIENT)
Dept: ADMINISTRATIVE | Facility: HOSPITAL | Age: 63
End: 2018-11-14

## 2018-11-14 ENCOUNTER — HOSPITAL ENCOUNTER (OUTPATIENT)
Dept: GENERAL RADIOLOGY | Facility: HOSPITAL | Age: 63
Discharge: HOME | End: 2018-11-14
Admitting: FAMILY MEDICINE

## 2018-11-14 DIAGNOSIS — M79.644 PAIN OF RIGHT THUMB: Primary | ICD-10-CM

## 2018-11-14 PROCEDURE — 73140 X-RAY EXAM OF FINGER(S): CPT

## 2019-01-14 ENCOUNTER — SPECIALTY PHARMACY (OUTPATIENT)
Dept: ONCOLOGY | Facility: HOSPITAL | Age: 64
End: 2019-01-14

## 2019-01-14 RX ORDER — GLATIRAMER 40 MG/ML
40 INJECTION, SOLUTION SUBCUTANEOUS 3 TIMES WEEKLY
Qty: 11.76 ML | Refills: 11 | Status: SHIPPED | OUTPATIENT
Start: 2019-01-14 | End: 2019-10-28 | Stop reason: SDUPTHER

## 2019-04-12 ENCOUNTER — OFFICE VISIT (OUTPATIENT)
Dept: NEUROLOGY | Facility: CLINIC | Age: 64
End: 2019-04-12

## 2019-04-12 VITALS
BODY MASS INDEX: 28.78 KG/M2 | RESPIRATION RATE: 16 BRPM | OXYGEN SATURATION: 97 % | SYSTOLIC BLOOD PRESSURE: 140 MMHG | HEART RATE: 78 BPM | WEIGHT: 156.4 LBS | DIASTOLIC BLOOD PRESSURE: 90 MMHG | HEIGHT: 62 IN

## 2019-04-12 DIAGNOSIS — F33.41 RECURRENT MAJOR DEPRESSIVE DISORDER, IN PARTIAL REMISSION (HCC): ICD-10-CM

## 2019-04-12 DIAGNOSIS — G35 MULTIPLE SCLEROSIS (HCC): Primary | ICD-10-CM

## 2019-04-12 DIAGNOSIS — G43.C0 PERIODIC HEADACHE SYNDROME, NOT INTRACTABLE: ICD-10-CM

## 2019-04-12 PROCEDURE — 99214 OFFICE O/P EST MOD 30 MIN: CPT | Performed by: PSYCHIATRY & NEUROLOGY

## 2019-04-12 RX ORDER — LISINOPRIL AND HYDROCHLOROTHIAZIDE 12.5; 1 MG/1; MG/1
1 TABLET ORAL DAILY
COMMUNITY

## 2019-04-12 NOTE — PROGRESS NOTES
Subjective:   Chief Complaint   Patient presents with   • Multiple Sclerosis     6m f/u      RRMS    Patient ID: Jagruti Morgan is a 64 y.o. female.    History of Present Illness     64 y.o.  woman with RRMS, fatigue and MDD returns in follow up.  Last visit on 10/8/18 continued Copaxone.       MRI Brain, 3/28/18  compared to 8/9/17 moderate to extensive T2 lesion load without new/enhancing/enalrging lesions    MDD     Anxiety waxing and wanes.      Migraines    HA controlled.   Located in top of head.  Quality dull ache.  Mild - moderate intensity.  Sense of of unsteadiness.  Low salt diet improving sx.     RRMS    MSFC     25FTW 6.91 sec, 9 hole peg 15.4 sec, 19.37 sec  SDMT 50      Tolerating Copaxone with minimal skin reactions.   No new or worsening sx.   Fatigue is mild.  Heat intolerance causes fatigue.  Exercising daily.         The following portions of the patient's history were reviewed and updated as appropriate: allergies, current medications, past medical history, past surgical history and problem list.    Review of Systems   Constitutional: Positive for fatigue. Negative for activity change and unexpected weight change.   HENT: Negative for tinnitus and trouble swallowing.    Eyes: Negative for photophobia and visual disturbance.   Respiratory: Negative for apnea and choking.    Cardiovascular: Negative for leg swelling.   Endocrine: Positive for heat intolerance. Negative for cold intolerance.   Genitourinary: Negative for difficulty urinating, frequency, menstrual problem and urgency.   Musculoskeletal: Negative for back pain and gait problem.   Skin: Negative for color change.   Allergic/Immunologic: Negative for immunocompromised state.   Neurological: Negative for dizziness, tremors, seizures, syncope, facial asymmetry, speech difficulty and light-headedness.   Hematological: Negative for adenopathy. Does not bruise/bleed easily.   Psychiatric/Behavioral: Positive for decreased concentration  "and dysphoric mood. Negative for behavioral problems, confusion, hallucinations and sleep disturbance. The patient is nervous/anxious.         Objective:  Vitals:    04/12/19 1009   BP: 140/90   Pulse: 78   Resp: 16   SpO2: 97%   Weight: 70.9 kg (156 lb 6.4 oz)   Height: 157.5 cm (62\")       Neurologic Exam     Mental Status   Oriented to person, place, and time.   Registration: recalls 3 of 3 objects. Recall at 5 minutes: recalls 3 of 3 objects. Follows 3 step commands.   Attention: normal. Concentration: normal.   Speech: speech is normal   Level of consciousness: alert  Knowledge: good and consistent with education.   Able to name object. Able to read. Able to repeat. Able to write. Normal comprehension.     Cranial Nerves     CN II   Visual fields full to confrontation.   Visual acuity: normal  Right visual field deficit: none  Left visual field deficit: none     CN III, IV, VI   Pupils are equal, round, and reactive to light.  Extraocular motions are normal.   Nystagmus: none   Diplopia: none  Ophthalmoparesis: none  Upgaze: normal  Downgaze: normal  Conjugate gaze: present  Vestibulo-ocular reflex: present    CN V   Facial sensation intact.   Right corneal reflex: normal  Left corneal reflex: normal    CN VII   Right facial weakness: none  Left facial weakness: none    CN VIII   Hearing: intact    CN IX, X   Palate: symmetric  Right gag reflex: normal  Left gag reflex: normal    CN XI   Right sternocleidomastoid strength: normal  Left sternocleidomastoid strength: normal    CN XII   Tongue: not atrophic  Fasciculations: absent  Tongue deviation: none    Motor Exam   Muscle bulk: normal  Overall muscle tone: normal  Right arm tone: normal  Left arm tone: normal  Right leg tone: normal  Left leg tone: normal    Strength   Strength 5/5 throughout.     Sensory Exam   Light touch normal.   Pinprick normal.     Gait, Coordination, and Reflexes     Coordination   Romberg: negative  Finger to nose coordination: " normal  Heel to shin coordination: normal  Tandem walking coordination: normal    Tremor   Resting tremor: absent  Intention tremor: absent  Action tremor: absent    Reflexes   Reflexes 2+ except as noted.       Physical Exam   Constitutional: She is oriented to person, place, and time. She appears well-developed and well-nourished.   Eyes: EOM are normal. Pupils are equal, round, and reactive to light.   Neurological: She is oriented to person, place, and time. She has normal strength. She has a normal Finger-Nose-Finger Test, a normal Heel to Shin Test, a normal Romberg Test and a normal Tandem Gait Test.   Psychiatric: Her speech is normal.   Nursing note and vitals reviewed.      Assessment/Plan:       Problems Addressed this Visit        Cardiovascular and Mediastinum    Periodic headache syndrome, not intractable     Headaches are improving with treatment.  Continue current treatment regimen.                Nervous and Auditory    Multiple sclerosis (CMS/HCC) - Primary     MSFC stable on GA              Other    Depression     Psychological condition is unchanged.  Continue current treatment regimen.  Psychological condition  will be reassessed at the next regular appointment.

## 2019-10-16 ENCOUNTER — OFFICE VISIT (OUTPATIENT)
Dept: NEUROLOGY | Facility: CLINIC | Age: 64
End: 2019-10-16

## 2019-10-16 VITALS
BODY MASS INDEX: 29.08 KG/M2 | HEIGHT: 62 IN | OXYGEN SATURATION: 97 % | DIASTOLIC BLOOD PRESSURE: 82 MMHG | WEIGHT: 158 LBS | SYSTOLIC BLOOD PRESSURE: 130 MMHG | HEART RATE: 79 BPM

## 2019-10-16 DIAGNOSIS — G43.C0 PERIODIC HEADACHE SYNDROME, NOT INTRACTABLE: ICD-10-CM

## 2019-10-16 DIAGNOSIS — F41.9 ANXIETY: ICD-10-CM

## 2019-10-16 DIAGNOSIS — G35 MULTIPLE SCLEROSIS (HCC): Primary | ICD-10-CM

## 2019-10-16 PROCEDURE — 99214 OFFICE O/P EST MOD 30 MIN: CPT | Performed by: PSYCHIATRY & NEUROLOGY

## 2019-10-16 NOTE — PROGRESS NOTES
Subjective:   Chief Complaint   Patient presents with   • Multiple Sclerosis          Patient ID: Jagruti Morgan is a 64 y.o. female.    History of Present Illness     64 y.o.  woman with RRMS, fatigue and MDD returns in follow up.  Last visit on 4/12/18 continued Copaxone.       MRI Brain, 3/28/18  compared to 8/9/17 moderate to extensive T2 lesion load without new/enhancing/enalrging lesions    MDD     Mood improved.     Migraines    HA controlled.  Frequency is once a month   Located in top of head.  Quality dull ache.  Mild - moderate intensity.  Sense of of unsteadiness.  Low salt diet improving sx.     RRMS    No new or worsening sx on Copaxone.   Hurts to do shots on legs and arms.      MSFC     25FTW 6.91 sec, 9 hole peg 15.4 sec, 19.37 sec  SDMT 50    Tolerating Copaxone with minimal skin reactions.   No new or worsening sx.   Fatigue is mild.  Heat intolerance causes fatigue.  Exercising daily.         The following portions of the patient's history were reviewed and updated as appropriate: allergies, current medications, past medical history, past surgical history and problem list.    Review of Systems   Constitutional: Negative for activity change and unexpected weight change.   HENT: Negative for tinnitus and trouble swallowing.    Eyes: Negative for photophobia and visual disturbance.   Respiratory: Negative for apnea and choking.    Cardiovascular: Negative for leg swelling.   Endocrine: Positive for heat intolerance. Negative for cold intolerance.   Genitourinary: Negative for difficulty urinating, frequency, menstrual problem and urgency.   Musculoskeletal: Negative for back pain and gait problem.   Skin: Negative for color change.   Allergic/Immunologic: Negative for immunocompromised state.   Neurological: Negative for dizziness, tremors, seizures, syncope, facial asymmetry, speech difficulty and light-headedness.   Hematological: Negative for adenopathy. Does not bruise/bleed easily.  "  Psychiatric/Behavioral: Positive for decreased concentration and dysphoric mood. Negative for behavioral problems, confusion, hallucinations and sleep disturbance. The patient is nervous/anxious.         Objective:  Vitals:    10/16/19 1050   BP: 130/82   Pulse: 79   SpO2: 97%   Weight: 71.7 kg (158 lb)   Height: 157.5 cm (62\")       Neurologic Exam     Mental Status   Oriented to person, place, and time.   Registration: recalls 3 of 3 objects. Recall at 5 minutes: recalls 3 of 3 objects. Follows 3 step commands.   Attention: normal. Concentration: normal.   Speech: speech is normal   Level of consciousness: alert  Knowledge: good and consistent with education.   Able to name object. Able to read. Able to repeat. Able to write. Normal comprehension.     Cranial Nerves     CN II   Visual fields full to confrontation.   Visual acuity: normal  Right visual field deficit: none  Left visual field deficit: none     CN III, IV, VI   Pupils are equal, round, and reactive to light.  Extraocular motions are normal.   Nystagmus: none   Diplopia: none  Ophthalmoparesis: none  Upgaze: normal  Downgaze: normal  Conjugate gaze: present  Vestibulo-ocular reflex: present    CN V   Facial sensation intact.   Right corneal reflex: normal  Left corneal reflex: normal    CN VII   Right facial weakness: none  Left facial weakness: none    CN VIII   Hearing: intact    CN IX, X   Palate: symmetric  Right gag reflex: normal  Left gag reflex: normal    CN XI   Right sternocleidomastoid strength: normal  Left sternocleidomastoid strength: normal    CN XII   Tongue: not atrophic  Fasciculations: absent  Tongue deviation: none    Motor Exam   Muscle bulk: normal  Overall muscle tone: normal  Right arm tone: normal  Left arm tone: normal  Right leg tone: normal  Left leg tone: normal    Strength   Strength 5/5 throughout.     Sensory Exam   Light touch normal.   Pinprick normal.     Gait, Coordination, and Reflexes     Coordination   Romberg: " negative  Finger to nose coordination: normal  Heel to shin coordination: normal  Tandem walking coordination: normal    Tremor   Resting tremor: absent  Intention tremor: absent  Action tremor: absent    Reflexes   Reflexes 2+ except as noted.       Physical Exam   Constitutional: She is oriented to person, place, and time. She appears well-developed and well-nourished.   Eyes: EOM are normal. Pupils are equal, round, and reactive to light.   Neurological: She is oriented to person, place, and time. She has normal strength. She has a normal Finger-Nose-Finger Test, a normal Heel to Shin Test, a normal Romberg Test and a normal Tandem Gait Test.   Psychiatric: Her speech is normal.   Nursing note and vitals reviewed.      Assessment/Plan:       Problems Addressed this Visit        Cardiovascular and Mediastinum    Periodic headache syndrome, not intractable     Headaches are unchanged.  Continue current treatment regimen.                Nervous and Auditory    Multiple sclerosis (CMS/HCC) - Primary     Sx stable on GA                 Other    Anxiety     Improved off interferon

## 2019-10-28 ENCOUNTER — SPECIALTY PHARMACY (OUTPATIENT)
Dept: PHARMACY | Facility: HOSPITAL | Age: 64
End: 2019-10-28

## 2019-10-28 RX ORDER — GLATIRAMER 40 MG/ML
40 INJECTION, SOLUTION SUBCUTANEOUS 3 TIMES WEEKLY
Qty: 35.28 ML | Refills: 3 | Status: SHIPPED | OUTPATIENT
Start: 2019-10-28 | End: 2020-09-29

## 2020-01-27 ENCOUNTER — OFFICE VISIT (OUTPATIENT)
Dept: NEUROSURGERY | Facility: CLINIC | Age: 65
End: 2020-01-27

## 2020-01-27 VITALS
BODY MASS INDEX: 29.08 KG/M2 | WEIGHT: 158 LBS | SYSTOLIC BLOOD PRESSURE: 140 MMHG | HEIGHT: 62 IN | DIASTOLIC BLOOD PRESSURE: 78 MMHG

## 2020-01-27 DIAGNOSIS — M51.36 DEGENERATIVE DISC DISEASE, LUMBAR: Primary | ICD-10-CM

## 2020-01-27 PROCEDURE — 99243 OFF/OP CNSLTJ NEW/EST LOW 30: CPT | Performed by: NEUROLOGICAL SURGERY
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
## 2020-06-22 ENCOUNTER — OFFICE VISIT (OUTPATIENT)
Dept: NEUROLOGY | Facility: CLINIC | Age: 65
End: 2020-06-22

## 2020-06-22 VITALS
TEMPERATURE: 97.4 F | BODY MASS INDEX: 27.97 KG/M2 | HEART RATE: 89 BPM | SYSTOLIC BLOOD PRESSURE: 126 MMHG | DIASTOLIC BLOOD PRESSURE: 82 MMHG | OXYGEN SATURATION: 99 % | HEIGHT: 62 IN | WEIGHT: 152 LBS

## 2020-06-22 DIAGNOSIS — G43.C0 PERIODIC HEADACHE SYNDROME, NOT INTRACTABLE: ICD-10-CM

## 2020-06-22 DIAGNOSIS — F33.41 RECURRENT MAJOR DEPRESSIVE DISORDER, IN PARTIAL REMISSION (HCC): ICD-10-CM

## 2020-06-22 DIAGNOSIS — G35 MULTIPLE SCLEROSIS (HCC): Primary | ICD-10-CM

## 2020-06-22 PROCEDURE — 99214 OFFICE O/P EST MOD 30 MIN: CPT | Performed by: PSYCHIATRY & NEUROLOGY

## 2020-06-22 RX ORDER — LEVOTHYROXINE SODIUM 0.07 MG/1
75 TABLET ORAL DAILY
COMMUNITY
Start: 2020-05-05

## 2020-06-22 NOTE — PROGRESS NOTES
Subjective:   Chief Complaint   Patient presents with   • Multiple Sclerosis     Follow up         Patient ID: Jagruti Morgan is a 65 y.o. female.    History of Present Illness     65 y.o.  woman with RRMS, fatigue and MDD returns in follow up.  Last visit on 10/16/19 continued Copaxone.       MRI Brain, 3/28/18 compared to 8/9/17 moderate to extensive T2 lesion load without new/enhancing/enalrging lesions    MDD     Mood stable.     Migraines    HA controlled.  Frequency is rare.   Located in top of head.  Quality dull ache.  Mild - moderate intensity.  Sense of of unsteadiness.  Low salt diet improving sx.     RRMS    No new or worsening sx on Copaxone.       Tolerating Copaxone with minimal skin reactions.  Fatigue is mild.  Heat intolerance causes fatigue.  Exercising daily.         The following portions of the patient's history were reviewed and updated as appropriate: allergies, current medications, past medical history, past surgical history and problem list.    Review of Systems   Constitutional: Negative for activity change and unexpected weight change.   HENT: Negative for tinnitus and trouble swallowing.    Eyes: Negative for photophobia and visual disturbance.   Respiratory: Negative for apnea and choking.    Cardiovascular: Negative for leg swelling.   Endocrine: Positive for heat intolerance. Negative for cold intolerance.   Genitourinary: Negative for difficulty urinating, frequency, menstrual problem and urgency.   Musculoskeletal: Negative for back pain and gait problem.   Skin: Negative for color change.   Allergic/Immunologic: Negative for immunocompromised state.   Neurological: Negative for dizziness, tremors, seizures, syncope, facial asymmetry, speech difficulty and light-headedness.   Hematological: Negative for adenopathy. Does not bruise/bleed easily.   Psychiatric/Behavioral: Positive for decreased concentration and dysphoric mood. Negative for behavioral problems, confusion,  "hallucinations and sleep disturbance. The patient is nervous/anxious.         Objective:  Vitals:    06/22/20 1303   BP: 126/82   Pulse: 89   Temp: 97.4 °F (36.3 °C)   SpO2: 99%   Weight: 68.9 kg (152 lb)   Height: 157.5 cm (62.01\")       Neurologic Exam     Mental Status   Oriented to person, place, and time.   Registration: recalls 3 of 3 objects. Recall at 5 minutes: recalls 3 of 3 objects. Follows 3 step commands.   Attention: normal. Concentration: normal.   Speech: speech is normal   Level of consciousness: alert  Knowledge: good and consistent with education.   Able to name object. Able to read. Able to repeat. Able to write. Normal comprehension.     Cranial Nerves     CN II   Visual fields full to confrontation.   Visual acuity: normal  Right visual field deficit: none  Left visual field deficit: none     CN III, IV, VI   Pupils are equal, round, and reactive to light.  Extraocular motions are normal.   Nystagmus: none   Diplopia: none  Ophthalmoparesis: none  Upgaze: normal  Downgaze: normal  Conjugate gaze: present  Vestibulo-ocular reflex: present    CN V   Facial sensation intact.   Right corneal reflex: normal  Left corneal reflex: normal    CN VII   Right facial weakness: none  Left facial weakness: none    CN VIII   Hearing: intact    CN IX, X   Palate: symmetric  Right gag reflex: normal  Left gag reflex: normal    CN XI   Right sternocleidomastoid strength: normal  Left sternocleidomastoid strength: normal    CN XII   Tongue: not atrophic  Fasciculations: absent  Tongue deviation: none    Motor Exam   Muscle bulk: normal  Overall muscle tone: normal  Right arm tone: normal  Left arm tone: normal  Right leg tone: normal  Left leg tone: normal    Strength   Strength 5/5 throughout.     Sensory Exam   Light touch normal.   Pinprick normal.     Gait, Coordination, and Reflexes     Coordination   Romberg: negative  Finger to nose coordination: normal  Heel to shin coordination: normal  Tandem walking " coordination: normal    Tremor   Resting tremor: absent  Intention tremor: absent  Action tremor: absent    Reflexes   Reflexes 2+ except as noted.       Physical Exam   Constitutional: She is oriented to person, place, and time. She appears well-developed and well-nourished.   Eyes: Pupils are equal, round, and reactive to light. EOM are normal.   Neurological: She is oriented to person, place, and time. She has normal strength. She has a normal Finger-Nose-Finger Test, a normal Heel to Shin Test, a normal Romberg Test and a normal Tandem Gait Test.   Psychiatric: Her speech is normal.   Nursing note and vitals reviewed.      Assessment/Plan:       Problems Addressed this Visit        Cardiovascular and Mediastinum    Periodic headache syndrome, not intractable     Headaches are unchanged.  Continue current treatment regimen.                Nervous and Auditory    Multiple sclerosis (CMS/HCC) - Primary     No new or worsening sx on GA              Other    Depression     Psychological condition is unchanged.  Continue current treatment regimen.  Psychological condition  will be reassessed at the next regular appointment.

## 2020-09-29 ENCOUNTER — SPECIALTY PHARMACY (OUTPATIENT)
Dept: ONCOLOGY | Facility: HOSPITAL | Age: 65
End: 2020-09-29

## 2020-09-29 RX ORDER — GLATIRAMER 40 MG/ML
INJECTION, SOLUTION SUBCUTANEOUS
Qty: 36 ML | Refills: 3 | Status: SHIPPED | OUTPATIENT
Start: 2020-09-29 | End: 2021-08-12

## 2020-12-08 ENCOUNTER — TELEPHONE (OUTPATIENT)
Dept: NEUROLOGY | Facility: CLINIC | Age: 65
End: 2020-12-08

## 2020-12-08 NOTE — TELEPHONE ENCOUNTER
Patient has MS and is wondering if she can get the vaccine or not or if will benefit her.       Can someone please contact her and advise if the Covid vaccine is ok?     [phone]

## 2020-12-09 NOTE — TELEPHONE ENCOUNTER
Patient may take the inactivated COVID 19 vaccine. We do not have the vaccinations available at this time. I recommend she contact her PCP and let them know she is interested in receiving in the vaccine once it becomes available.

## 2020-12-09 NOTE — TELEPHONE ENCOUNTER
Spoke with patient. Told her I spoke with Diana and that she is safe to take the vaccine once available. Patient verbalized understanding.

## 2021-01-26 ENCOUNTER — TELEPHONE (OUTPATIENT)
Dept: NEUROLOGY | Facility: CLINIC | Age: 66
End: 2021-01-26

## 2021-01-26 NOTE — TELEPHONE ENCOUNTER
Notified changing to video visit is fine. She has all of the my chart instructions and switched her in person video in the system.

## 2021-01-26 NOTE — TELEPHONE ENCOUNTER
Provider: DR. ARRIAGA  Caller: PAUL SHELTON  Relationship to Patient:SELF    Reason for Call: PT CALLING ASKING IF HER APPT ON 2- AT 2:l30PM IF IT CAN BE DONE AS A MYCHART VIDEO, PT DOES HAVE MYCHART. I HAVE EXPLAIN TO HER HOW TO DO IT.     PLEASE CALL HER BACK [phone]

## 2021-02-04 ENCOUNTER — TELEMEDICINE (OUTPATIENT)
Dept: NEUROLOGY | Facility: CLINIC | Age: 66
End: 2021-02-04

## 2021-02-04 DIAGNOSIS — G35 MULTIPLE SCLEROSIS (HCC): Primary | Chronic | ICD-10-CM

## 2021-02-04 DIAGNOSIS — F33.41 RECURRENT MAJOR DEPRESSIVE DISORDER, IN PARTIAL REMISSION (HCC): Chronic | ICD-10-CM

## 2021-02-04 DIAGNOSIS — G43.C0 PERIODIC HEADACHE SYNDROME, NOT INTRACTABLE: Chronic | ICD-10-CM

## 2021-02-04 PROCEDURE — 99214 OFFICE O/P EST MOD 30 MIN: CPT | Performed by: PSYCHIATRY & NEUROLOGY

## 2021-02-04 NOTE — PROGRESS NOTES
You have chosen to receive care through a telehealth visit.  Do you consent to use a video/audio connection for your medical care today? Yes     Time:  12 minutes    Chief Complaint  Multiple Sclerosis and Depression    Subjective          Jagruti Morgan presents to [facility] for RRMS, fatigue, MDD.    History of Present Illness    65 y.o. female returns in follow up.  Last visit on 6/22/20 continued GA.      MRI Brain, 3/28/18 compared to 8/9/17 moderate to extensive T2 lesion load without new/enhancing/enalrging lesions     MDD      Mood stable.      Migraines     HA controlled.  Frequency is rare.   Located in top of head.  Quality dull ache.  Mild - moderate intensity.  Sense of of unsteadiness.  Low salt diet improving sx.      RRMS     Exercising on a treadmill.      No new or worsening sx on Copaxone.        Tolerating Copaxone with minimal skin reactions.  Fatigue is mild.      Heat intolerance causes fatigue.  Exercising daily.      Objective   Vital Signs:      Physical Exam  Eyes:      Extraocular Movements: EOM normal.      Pupils: Pupils are equal, round, and reactive to light.   Neurological:      Mental Status: She is oriented to person, place, and time.      Gait: Gait is intact.      Deep Tendon Reflexes: Strength normal.   Psychiatric:         Speech: Speech normal.          Neurologic Exam     Mental Status   Oriented to person, place, and time.   Speech: speech is normal   Level of consciousness: alert  Knowledge: good and consistent with education.   Normal comprehension.     Cranial Nerves   Cranial nerves II through XII intact.     CN II   Visual fields full to confrontation.   Visual acuity: normal  Right visual field deficit: none  Left visual field deficit: none     CN III, IV, VI   Pupils are equal, round, and reactive to light.  Extraocular motions are normal.   Nystagmus: none   Diplopia: none  Ophthalmoparesis: none  Upgaze: normal  Downgaze:  normal  Conjugate gaze: present    CN V   Facial sensation intact.   Right corneal reflex: normal  Left corneal reflex: normal    CN VII   Right facial weakness: none  Left facial weakness: none    CN VIII   Hearing: intact    CN IX, X   Palate: symmetric  Right gag reflex: normal  Left gag reflex: normal    CN XI   Right sternocleidomastoid strength: normal  Left sternocleidomastoid strength: normal    CN XII   Tongue: not atrophic  Fasciculations: absent  Tongue deviation: none    Motor Exam   Muscle bulk: normal  Overall muscle tone: normal    Strength   Strength 5/5 throughout.     Sensory Exam   Light touch normal.     Gait, Coordination, and Reflexes     Gait  Gait: normal    Tremor   Resting tremor: absent  Intention tremor: absent  Action tremor: absent    Reflexes   Reflexes 2+ except as noted.      Result Review :   The following data was reviewed by: Duane Dalton MD on 02/04/2021:                Assessment and Plan    Problem List Items Addressed This Visit        Mental Health    Depression (Chronic)    Current Assessment & Plan     Psychological condition is unchanged.  Continue current treatment regimen.  Psychological condition  will be reassessed at the next regular appointment.            Neuro    Multiple sclerosis (CMS/HCC) - Primary (Chronic)    Current Assessment & Plan     No new or worsening sx     Continue GA          Periodic headache syndrome, not intractable (Chronic)    Current Assessment & Plan     Headaches are improving with treatment.  Continue current treatment regimen.                   Follow Up   Return in about 6 months (around 8/4/2021).  Patient was given instructions and counseling regarding her condition or for health maintenance advice. Please see specific information pulled into the AVS if appropriate.

## 2021-03-10 ENCOUNTER — TELEPHONE (OUTPATIENT)
Dept: NEUROLOGY | Facility: CLINIC | Age: 66
End: 2021-03-10

## 2021-03-10 NOTE — TELEPHONE ENCOUNTER
----- Message from Bella Martins RN sent at 3/10/2021 11:50 AM EST -----  Regarding: Non-Urgent Medical Question  Contact: [phone]  Please advise. Or would you like me to have her contact PCP?    ----- Message -----  From: Jagruti Morgan  Sent: 3/10/2021  11:07 AM EST  To: [facility] [place]  Subject: Non-Urgent Medical Question                      Dr. Dalton, I got my first covid vaccine on 2/27. On 3/6 I contracted shingles. I’m taking medication for 7 days. The earliest I can get my second vaccine is 3/19. I really want my second vaccine, should I go ahead and schedule  3/19 or wait a couple of weeks later maybe 4/3?   I also want a shingles vaccine as soon as possible, how long would I have to wait?

## 2021-03-10 NOTE — TELEPHONE ENCOUNTER
As long as the rash has gone away and she is shingles free she should be able to get the second COVID vaccine, however I recommend she contact the site where she getting the vaccine and make sure they do not have a specific requirement time.   As for her Shingles vaccination she will get through her PCP, I recommend asking when to get the shingles vaccination after having shingles.

## 2021-04-06 ENCOUNTER — TELEPHONE (OUTPATIENT)
Dept: NEUROLOGY | Facility: CLINIC | Age: 66
End: 2021-04-06

## 2021-04-06 NOTE — TELEPHONE ENCOUNTER
----- Message from Jagruti Morgan sent at 4/6/2021  2:01 PM EDT -----  Regarding: Non-Urgent Medical Question  Contact: [phone]  My shingles rash hasn’t gone away.   I have an appointment for my second COVID vaccine which will be 8 weeks and 2 days since my first vaccine   I know you said to wait till the rash is gone, but the time between the COVID vaccines is already past the time limit   I called the hot line and was told to go ahead with the rash and get the second COVID vaccine. I just wanted to make sure this is safe for me to do.  Thanks

## 2021-08-12 RX ORDER — GLATIRAMER 40 MG/ML
INJECTION, SOLUTION SUBCUTANEOUS
Qty: 36 ML | Refills: 3 | Status: SHIPPED | OUTPATIENT
Start: 2021-08-12 | End: 2022-05-10

## 2021-09-14 ENCOUNTER — OFFICE VISIT (OUTPATIENT)
Dept: NEUROLOGY | Facility: CLINIC | Age: 66
End: 2021-09-14

## 2021-09-14 VITALS
BODY MASS INDEX: 28.71 KG/M2 | SYSTOLIC BLOOD PRESSURE: 124 MMHG | HEIGHT: 62 IN | HEART RATE: 85 BPM | DIASTOLIC BLOOD PRESSURE: 82 MMHG | WEIGHT: 156 LBS | OXYGEN SATURATION: 96 %

## 2021-09-14 DIAGNOSIS — G35 MULTIPLE SCLEROSIS (HCC): Primary | ICD-10-CM

## 2021-09-14 DIAGNOSIS — F41.9 ANXIETY: ICD-10-CM

## 2021-09-14 DIAGNOSIS — G43.C0 PERIODIC HEADACHE SYNDROME, NOT INTRACTABLE: ICD-10-CM

## 2021-09-14 PROCEDURE — 99214 OFFICE O/P EST MOD 30 MIN: CPT | Performed by: NURSE PRACTITIONER

## 2021-09-14 NOTE — PROGRESS NOTES
Neuro Office Visit      Encounter Date: 2021   Patient Name: Jagruti Morgan  : 1955   MRN: [id number]     Chief Complaint:    Chief Complaint   Patient presents with   • Multiple Sclerosis       History of Present Illness: Jagruti Morgan is a 66 y.o. female who is here today in Neurology for  RRMS and HA    Last appt 21 w Dr [id number]-OLSE-wazj GA, HA-cont, MDD    HA  Once a month due, located on top of head with a dull ache. Mild to mod intensity. Relieved with Advil or Tylenol. On beta blocker.    Anxiety  Stable on beta blocker. Denies depression.    RRMS  Tolerating Glatirarmer 3 x week. No reactions. Symptoms are controlled. MIld fatigue and heat sensitivity.  No weakness or falls. No N/T.  No tremor or spasm. Some throat irritation. No bladder or bowel issues.  No hug. Feels electric shock in feet. Previously on Betaseron  MRI Brain With & Without Contrast (2018 09:20)  Labs at  2021 WBC 6.62  CMP-WNL    Seeing Dr Jordan for eye    Problem History  12/10/2001   OD loss of central vision. OS loss of lower vision. Heat sensitive. Fatigue with heat and exercise.     Recently diagnosed with plantar fascitis.    Had covid vaccine.    Subjective      Past Medical History:   Past Medical History:   Diagnosis Date   • Abnormal heart rhythm    • Anxiety    • Depression    • Former smoker    • HL (hearing loss) Use hearing aids   • Hyperlipidemia    • Hypertension    • MS (multiple sclerosis) (CMS/HCC)    • PVC's (premature ventricular contractions)    • Stroke (CMS/HCC)     in her left eye   • Thyroid disorder    • Vision loss Optic neuritis       Past Surgical History:   Past Surgical History:   Procedure Laterality Date   •  SECTION     • NO PAST SURGERIES         Family History:   Family History   Problem Relation Age of Onset   • Alzheimer's disease Mother    • Dementia Mother          from alziehmier's   • Diabetes Mother    • Hypertension Father    • Heart attack  Father    • Stroke Other    • Parkinsonism Paternal Uncle         Dianoised at 80   • Stroke Maternal Aunt    • Cancer Brother         bladder cancer       Social History:   Social History     Socioeconomic History   • Marital status:      Spouse name: Not on file   • Number of children: Not on file   • Years of education: Not on file   • Highest education level: Not on file   Tobacco Use   • Smoking status: Former Smoker   • Smokeless tobacco: Never Used   • Tobacco comment: Quit 23 years ago   Vaping Use   • Vaping Use: Never used   Substance and Sexual Activity   • Alcohol use: Yes     Types: 6 Glasses of wine per week     Comment: occas   • Drug use: No   • Sexual activity: Yes     Partners: Male     Birth control/protection: Post-menopausal       Medications:     Current Outpatient Medications:   •  aspirin 81 MG tablet, Take 81 mg by mouth Daily., Disp: , Rfl:   •  Glatiramer Acetate 40 MG/ML solution prefilled syringe, INJECT 40 MG UNDER THE SKIN INTO THE APPROPRIATE AREA AS DIRECTED 3 TIMES A WEEK, Disp: 36 mL, Rfl: 3  •  ipratropium (ATROVENT) 0.03 % nasal spray, , Disp: , Rfl:   •  levothyroxine (SYNTHROID, LEVOTHROID) 75 MCG tablet, Take 75 mcg by mouth Daily., Disp: , Rfl:   •  lisinopril-hydrochlorothiazide (PRINZIDE,ZESTORETIC) 10-12.5 MG per tablet, Take 1 tablet by mouth Daily., Disp: , Rfl:   •  metoprolol succinate XL (TOPROL-XL) 50 MG 24 hr tablet, Take 1 tablet by mouth Daily., Disp: , Rfl:   •  Multiple Vitamins-Minerals (MULTIVITAMIN ADULTS 50+ PO), Take  by mouth Daily., Disp: , Rfl:   •  pravastatin (PRAVACHOL) 40 MG tablet, Take 40 mg by mouth daily., Disp: , Rfl:     Allergies:   No Known Allergies    PHQ-9 Total Score:     STEADI Fall Risk Assessment has not been completed.    Objective     Physical Exam:   Physical Exam  Eyes:      Pupils: Pupils are equal, round, and reactive to light.   Neurological:      Mental Status: She is oriented to person, place, and time.       Coordination: Finger-Nose-Finger Test, Heel to Shin Test and Romberg Test normal.      Gait: Gait is intact.      Deep Tendon Reflexes:      Reflex Scores:       Tricep reflexes are 2+ on the right side and 2+ on the left side.       Bicep reflexes are 2+ on the right side and 2+ on the left side.       Brachioradialis reflexes are 2+ on the right side and 2+ on the left side.       Patellar reflexes are 2+ on the right side and 2+ on the left side.       Achilles reflexes are 2+ on the right side and 2+ on the left side.  Psychiatric:         Speech: Speech normal.         Neurologic Exam     Mental Status   Oriented to person, place, and time.   Follows 3 step commands.   Attention: normal. Concentration: normal.   Speech: speech is normal   Level of consciousness: alert  Knowledge: consistent with education.   Normal comprehension.     Cranial Nerves     CN III, IV, VI   Pupils are equal, round, and reactive to light.  Right pupil: Accommodation: intact.   Left pupil: Accommodation: intact.   CN III: no CN III palsy  CN VI: no CN VI palsy  Nystagmus: none   Diplopia: none  Upgaze: normal  Downgaze: normal  Conjugate gaze: present    CN VII   Facial expression full, symmetric.     CN VIII   Hearing: intact    CN XII   CN XII normal.     Motor Exam   Muscle bulk: normal  Overall muscle tone: normal    Strength   Right biceps: 5/5  Left biceps: 5/5  Right triceps: 5/5  Left triceps: 5/5  Right interossei: 5/5  Left interossei: 5/5  Right quadriceps: 5/5  Left quadriceps: 5/5  Right anterior tibial: 5/5  Left anterior tibial: 5/5  Right posterior tibial: 5/5  Left posterior tibial: 5/5    Sensory Exam   Light touch normal.     Gait, Coordination, and Reflexes     Gait  Gait: normal    Coordination   Romberg: negative  Finger to nose coordination: normal  Heel to shin coordination: normal    Tremor   Resting tremor: absent  Action tremor: absent    Reflexes   Right brachioradialis: 2+  Left brachioradialis: 2+  Right  "biceps: 2+  Left biceps: 2+  Right triceps: 2+  Left triceps: 2+  Right patellar: 2+  Left patellar: 2+  Right achilles: 2+  Left achilles: 2+  Right : 2+  Left : 2+       Vital Signs:   Vitals:    09/14/21 1316   BP: 124/82   Pulse: 85   SpO2: 96%   Weight: 70.8 kg (156 lb)   Height: 157.5 cm (62\")     Body mass index is 28.53 kg/m².     Results:   Imaging:   No Images in the past 120 days found..       Assessment / Plan      Assessment/Plan:   Diagnoses and all orders for this visit:    1. Multiple sclerosis (CMS/HCC) (Primary)  Comments:  Cont Glatirarmer. She is due for MRI but she wants to wait until after covid surge. Will order for March 2022.    2. Periodic headache syndrome, not intractable  Comments:  Cont Beta blocker, Tylenol and Advil prn.    3. Anxiety  Comments:  Cont beta blocker.       Patient Education:   Reviewed medications, potential side effects and signs and symptoms to report. Discussed risk versus benefits of treatment plan with patient and/or family-including medications, labs and radiology that may be ordered. Addressed questions and concerns during visit. Patient and/or family verbalized understanding and agree with plan. Instructed to call the office with any questions and report to ER with any life-threatening symptoms.     Follow Up:   Return in about 6 months (around 3/14/2022), or if symptoms worsen or fail to improve.    During this visit the following were done:  Labs Reviewed [x]    Labs Ordered []    Radiology Reports Reviewed [x]    Radiology Ordered []    PCP Records Reviewed []    Referring Provider Records Reviewed []    ER Records Reviewed []    Hospital Records Reviewed []    History Obtained From Family []    Radiology Images Reviewed []    Other Reviewed []    Records Requested []      Cindi Edwards, HECTOR, APRN  "

## 2021-10-25 ENCOUNTER — TELEPHONE (OUTPATIENT)
Dept: NEUROLOGY | Facility: CLINIC | Age: 66
End: 2021-10-25

## 2021-10-25 NOTE — TELEPHONE ENCOUNTER
Provider: BART  Caller: PT  Relationship to Patient: SELF  Pharmacy: N/A  Phone Number: [phone]  Reason for Call: PT IS FOLLOWING UP RE: EARLIER MESSAGE.    PLEASE CALL & ADVISE.    THANK YOU.

## 2021-10-25 NOTE — TELEPHONE ENCOUNTER
Pt is having vision issues in left eye. She states that there is a squiggly line and some blurry vision.    Pt was advised that Bella was with another patient and that she would return her call as soon as possible an also if need be she could go to the ed as well.    Please advise and call pt back at [phone]

## 2021-10-25 NOTE — TELEPHONE ENCOUNTER
Spoke with patient letting her know that Dr. Dalton believes its an ocular migraine. He advised patient to IBU 600mg and benadryl 25mg. She had appointment to see her eye doctor this week, asked if she should still keep it. I told her if she felt better and didn't think she needed it to cancel but it couldn't hurt. Patient verbalized understanding.

## 2021-10-25 NOTE — TELEPHONE ENCOUNTER
"Provider: DR. ARRIAGA  Caller: PAUL SHELTON  Relationship to Patient: SELF  Pharmacy: [id number] - [facility] - [place], [place] - [address] [place] [address] AT [facility] [phone] [facility] [phone] FX  Phone Number: [phone]  Reason for Call: VISION CHANGES INCLUDING \"A SQUIGGLY LINE FLOATING\" IN LEFT EYE  When was the patient last seen: 9/14/21  When did it start: LAST EVENING, 10/24/21  Where is it located: LEFT EYE  Characteristics of symptom/severity: MAKING READING DIFFICULT. REPORTS NO OTHER SYMPTOMS RELATED TO STROKE. NO HEADACHE, NO SPEECH CHANGES, NO EXTREMITY WEAKNESS, NO CONFUSION.   Timing- Is it constant or intermittent: CONSTANT    PT DOES NOTE THAT SHE HAS AN APPT WITH [facility] TOMORROW TO DISCUSS HER CONCERNS BUT WANTED TO ASK DR. ARRIAGA IF THIS COULD AT ALL BE RELATED TO HER MS.    I FELT IT WAS BEST TO WARM-TRANSFER PT TO THE OFFICE FOR FURTHER TRIAGING DUE TO MS DIAGNOSIS W/ POTENTIAL VISION CHANGES RELATED.     Call warm-transferred to: MERI    PLEASE REVIEW AND ADVISE.      "

## 2022-01-31 ENCOUNTER — DOCUMENTATION (OUTPATIENT)
Dept: ONCOLOGY | Facility: HOSPITAL | Age: 67
End: 2022-01-31

## 2022-03-15 ENCOUNTER — OFFICE VISIT (OUTPATIENT)
Dept: NEUROLOGY | Facility: CLINIC | Age: 67
End: 2022-03-15

## 2022-03-15 VITALS
WEIGHT: 147 LBS | OXYGEN SATURATION: 97 % | DIASTOLIC BLOOD PRESSURE: 80 MMHG | SYSTOLIC BLOOD PRESSURE: 130 MMHG | HEIGHT: 62 IN | TEMPERATURE: 97.1 F | BODY MASS INDEX: 27.05 KG/M2 | HEART RATE: 78 BPM | RESPIRATION RATE: 12 BRPM

## 2022-03-15 DIAGNOSIS — G35 MULTIPLE SCLEROSIS: Primary | ICD-10-CM

## 2022-03-15 DIAGNOSIS — G43.C0 PERIODIC HEADACHE SYNDROME, NOT INTRACTABLE: Chronic | ICD-10-CM

## 2022-03-15 PROCEDURE — 99214 OFFICE O/P EST MOD 30 MIN: CPT | Performed by: NURSE PRACTITIONER

## 2022-03-15 RX ORDER — ATORVASTATIN CALCIUM 40 MG/1
1 TABLET, FILM COATED ORAL DAILY
COMMUNITY
Start: 2021-11-18

## 2022-03-15 RX ORDER — ASPIRIN 81 MG/81MG
1 CAPSULE ORAL
COMMUNITY
End: 2022-05-10 | Stop reason: SDUPTHER

## 2022-03-15 RX ORDER — IPRATROPIUM BROMIDE 21 UG/1
2 SPRAY, METERED NASAL EVERY 12 HOURS
COMMUNITY

## 2022-03-15 NOTE — PROGRESS NOTES
Subjective:     Patient ID: Jagruti Morgan is a 67 y.o. female.    CC:   Chief Complaint   Patient presents with   • Multiple Sclerosis     6 month follow up       HPI:   History of Present Illness   65 y.o. female returns in follow up.  Last visit on 21 continued GA.       MRI Brain, 3/28/18 compared to 17 moderate to extensive T2 lesion load without new/enhancing/enalrging lesions    Labs: CBC, CMP 21 - NCS      MDD      Mood stable.      Migraines     HA controlled.  Frequency is rare.   Located in top of head.  Quality dull ache.  Mild - moderate intensity.  Sense of of unsteadiness.  Low salt diet improving sx.     Vision change  Had difficulty seeing out of the left eye due to increased floaters and dry eye, has a hx of optic neuritis in the right eye. 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 Patient denies any pain in bilateral eyes.      RRMS  No new or worsening sx on Copaxone.        Tolerating Copaxone, having knots following injections. Sometimes they resolve and sometimes they do not. Having difficulty with finding injection sites. Is not interested in changing medications.     Fatigue is mild.     Heat intolerance causes fatigue.  Exercising daily.         The following portions of the patient's history were reviewed and updated as appropriate: allergies, current medications, past family history, past social history, past surgical history and problem list.    Past Medical History:   Diagnosis Date   • Abnormal heart rhythm    • Anxiety    • Depression    • Former smoker    • HL (hearing loss) Use hearing aids   • Hyperlipidemia    • Hypertension    • MS (multiple sclerosis) (HCC)    • PVC's (premature ventricular contractions)    • Shingles Had shingles 3/21    Had second vaccine on    • Stroke (HCC)     in her left eye   • Thyroid disorder    • Vision loss Optic neuritis       Past Surgical History:   Procedure Laterality Date   •   "SECTION     • NO PAST SURGERIES         Social History     Socioeconomic History   • Marital status:    Tobacco Use   • Smoking status: Former Smoker     Packs/day: 1.00     Years: 10.00     Pack years: 10.00     Types: Cigarettes   • Smokeless tobacco: Never Used   • Tobacco comment: Quit 23 years ago   Vaping Use   • Vaping Use: Never used   Substance and Sexual Activity   • Alcohol use: Yes     Alcohol/week: 6.0 standard drinks     Types: 6 Glasses of wine per week     Comment: Wkly   • Drug use: No   • Sexual activity: Yes     Partners: Male     Birth control/protection: Post-menopausal       Family History   Problem Relation Age of Onset   • Alzheimer's disease Mother    • Dementia Mother          from alziehmier's   • Diabetes Mother    • Hypertension Father    • Heart attack Father    • Stroke Other    • Parkinsonism Paternal Uncle         Dianoised at 80   • Stroke Maternal Aunt    • Cancer Brother         bladder cancer        Objective:  /80   Pulse 78   Temp 97.1 °F (36.2 °C)   Resp 12   Ht 157.5 cm (62\")   Wt 66.7 kg (147 lb)   SpO2 97%   BMI 26.89 kg/m²     Neurologic Exam     Mental Status   Oriented to person, place, and time.   Follows 3 step commands.   Attention: normal. Concentration: normal.   Speech: speech is normal   Level of consciousness: alert  Knowledge: good and consistent with education.   Able to name object. Able to read. Able to repeat. Able to write. Normal comprehension.     Cranial Nerves     CN II   Visual fields full to confrontation.   Visual acuity: normal  Right visual field deficit: none  Left visual field deficit: none     CN III, IV, VI   Pupils are equal, round, and reactive to light.  Extraocular motions are normal.   Right pupil: Shape: regular. Reactivity: brisk. Consensual response: intact.   Left pupil: Shape: regular. Reactivity: brisk. Consensual response: intact.   Nystagmus: none   Diplopia: none  Ophthalmoparesis: none  Upgaze: " normal  Downgaze: normal  Conjugate gaze: present  Vestibulo-ocular reflex: present    CN V   Facial sensation intact.   Right corneal reflex: normal  Left corneal reflex: normal    CN VII   Right facial weakness: none  Left facial weakness: none    CN VIII   Hearing: intact    CN IX, X   Palate: symmetric  Right gag reflex: normal  Left gag reflex: normal    CN XI   Right sternocleidomastoid strength: normal  Left sternocleidomastoid strength: normal    CN XII   Tongue: not atrophic  Fasciculations: absent  Tongue deviation: none    Motor Exam   Muscle bulk: normal  Overall muscle tone: normal    Strength   Strength 5/5 throughout.     Gait, Coordination, and Reflexes     Gait  Gait: normal    Coordination   Finger to nose coordination: normal  Tandem walking coordination: normal    Tremor   Resting tremor: absent  Intention tremor: absent  Action tremor: absent    Reflexes   Reflexes 2+ except as noted.       Physical Exam  Vitals and nursing note reviewed.   Constitutional:       Appearance: Normal appearance.   HENT:      Head: Normocephalic and atraumatic.   Eyes:      Extraocular Movements: Extraocular movements intact and EOM normal.      Pupils: Pupils are equal, round, and reactive to light.   Skin:     General: Skin is warm and dry.   Neurological:      Mental Status: She is alert and oriented to person, place, and time.      Coordination: Finger-Nose-Finger Test normal.      Gait: Gait is intact. Tandem walk normal.      Deep Tendon Reflexes: Strength normal.   Psychiatric:         Mood and Affect: Mood normal.         Speech: Speech normal.         Assessment/Plan:       Diagnoses and all orders for this visit:    1. Multiple sclerosis (HCC) (Primary)  Assessment & Plan:  Continue GA, will begin injecting into the thighs.     Discussed potentially starting oral medications, patient declines at this time.     Labs UTD     Ordered MRI's     F/U in 8 weeks or sooner if needed     Orders:  -     MRI Brain  With & Without Contrast; Future  -     MRI Cervical Spine With & Without Contrast; Future    2. Periodic headache syndrome, not intractable  Assessment & Plan:  Headaches are improving with lifestyle modifications.  Continue current treatment regimen.      Stable              Reviewed medications, potential side effects and signs and symptoms to report. Discussed risk versus benefits of treatment plan with patient and/or family-including medications, labs and radiology that may be ordered. Addressed questions and concerns during visit. Patient and/or family verbalized understanding and agree with plan. Patient instructed to call the office with questions or concerns and report to ED with life-threatening symptoms.     AS THE PROVIDER, I PERSONALLY WORE PPE DURING ENTIRE FACE TO FACE ENCOUNTER IN CLINIC WITH THE PATIENT. PATIENT ALSO WORE PPE DURING ENTIRE FACE TO FACE ENCOUNTER EXCEPT FOR A MAX OF 30 SECONDS DURING NEUROLOGICAL EVALUATION OF CRANIAL NERVES AND THEN MASK WAS PLACED BACK OVER PATIENT FACE FOR REMAINDER OF VISIT. I WASHED MY HANDS BEFORE AND AFTER VISIT.    During this visit the following were done:  Labs Reviewed [x]    Labs Ordered []    Radiology Reports Reviewed []    Radiology Ordered [x]    PCP Records Reviewed []    Referring Provider Records Reviewed []    ER Records Reviewed []    Hospital Records Reviewed []    History Obtained From Family []    Radiology Images Reviewed []    Other Reviewed []    Records Requested []      Return in about 8 weeks (around 5/10/2022).      Diana Day, MARLENE  3/15/2022

## 2022-03-15 NOTE — ASSESSMENT & PLAN NOTE
Continue GA, will begin injecting into the thighs.     Discussed potentially starting oral medications, patient declines at this time.     Labs UTD     Ordered MRI's     F/U in 8 weeks or sooner if needed

## 2022-03-15 NOTE — ASSESSMENT & PLAN NOTE
Headaches are improving with lifestyle modifications.  Continue current treatment regimen.      Stable

## 2022-04-20 ENCOUNTER — HOSPITAL ENCOUNTER (OUTPATIENT)
Dept: MRI IMAGING | Facility: HOSPITAL | Age: 67
Discharge: HOME | End: 2022-04-20

## 2022-04-20 DIAGNOSIS — G35 MULTIPLE SCLEROSIS: ICD-10-CM

## 2022-04-20 PROCEDURE — 0 GADOBENATE DIMEGLUMINE 529 MG/ML SOLUTION: Performed by: NURSE PRACTITIONER

## 2022-04-20 PROCEDURE — 72156 MRI NECK SPINE W/O & W/DYE: CPT

## 2022-04-20 PROCEDURE — 70553 MRI BRAIN STEM W/O & W/DYE: CPT

## 2022-04-20 PROCEDURE — A9577 INJ MULTIHANCE: HCPCS | Performed by: NURSE PRACTITIONER

## 2022-04-20 RX ADMIN — GADOBENATE DIMEGLUMINE 15 ML: 529 INJECTION, SOLUTION INTRAVENOUS at 10:22

## 2022-04-21 ENCOUNTER — TELEPHONE (OUTPATIENT)
Dept: NEUROLOGY | Facility: CLINIC | Age: 67
End: 2022-04-21

## 2022-04-21 NOTE — TELEPHONE ENCOUNTER
----- Message from Duane Dalton MD sent at 4/20/2022 11:39 AM EDT -----  Notify pt her MRI is stable       ----- Message -----  From: Sumi, Rad Results [place] In  Sent: 4/20/2022  11:16 AM EDT  To: MARLENE Araujo

## 2022-05-10 ENCOUNTER — OFFICE VISIT (OUTPATIENT)
Dept: NEUROLOGY | Facility: CLINIC | Age: 67
End: 2022-05-10

## 2022-05-10 VITALS
WEIGHT: 147 LBS | TEMPERATURE: 97.1 F | BODY MASS INDEX: 27.05 KG/M2 | DIASTOLIC BLOOD PRESSURE: 78 MMHG | HEIGHT: 62 IN | OXYGEN SATURATION: 97 % | SYSTOLIC BLOOD PRESSURE: 122 MMHG | HEART RATE: 88 BPM

## 2022-05-10 DIAGNOSIS — G35 MULTIPLE SCLEROSIS: Primary | Chronic | ICD-10-CM

## 2022-05-10 DIAGNOSIS — F33.41 RECURRENT MAJOR DEPRESSIVE DISORDER, IN PARTIAL REMISSION: Chronic | ICD-10-CM

## 2022-05-10 DIAGNOSIS — G43.C0 PERIODIC HEADACHE SYNDROME, NOT INTRACTABLE: Chronic | ICD-10-CM

## 2022-05-10 PROCEDURE — 99214 OFFICE O/P EST MOD 30 MIN: CPT | Performed by: PSYCHIATRY & NEUROLOGY

## 2022-05-10 NOTE — PROGRESS NOTES
"Chief Complaint    Multiple Sclerosis    Subjective          Jagruti Morgan presents to [facility] NEUROLOGY     History of Present Illness    67 y.o. female returns in follow up.  Last visit on 3/15/22 ordered MRI B/C, continued GA.       MRI Brain/cerivcal, my review of films, 4/20/22 compared to 3/28/18 moderate to extensive T2 lesion load without new/enhancing/enalrging lesions, no cord lesions     OCT 4/18/22 normal     MDD      Mood stable.      Migraines     Frequency is 3 - 4 days a week.   Located in top of head.  Quality sharp.  Brief.  Started after injections in eyes started.      RRMS     Exercising on a treadmill.       No new or worsening sx on Copaxone.         Heat intolerance causes fatigue.        Objective   Vital Signs:  /78   Pulse 88   Temp 97.1 °F (36.2 °C)   Ht 157.5 cm (62.01\")   Wt 66.7 kg (147 lb)   SpO2 97%   BMI 26.88 kg/m²           Physical Exam  Eyes:      Extraocular Movements: EOM normal.      Pupils: Pupils are equal, round, and reactive to light.   Neurological:      Mental Status: She is oriented to person, place, and time.      Gait: Gait is intact.      Deep Tendon Reflexes: Strength normal.   Psychiatric:         Speech: Speech normal.          Neurologic Exam     Mental Status   Oriented to person, place, and time.   Speech: speech is normal   Level of consciousness: alert  Knowledge: good and consistent with education.   Normal comprehension.     Cranial Nerves   Cranial nerves II through XII intact.     CN II   Visual fields full to confrontation.   Visual acuity: normal  Right visual field deficit: none  Left visual field deficit: none     CN III, IV, VI   Pupils are equal, round, and reactive to light.  Extraocular motions are normal.   Nystagmus: none   Diplopia: none  Ophthalmoparesis: none  Upgaze: normal  Downgaze: normal  Conjugate gaze: present    CN V   Facial sensation intact.   Right corneal reflex: normal  Left corneal reflex: " normal    CN VII   Right facial weakness: none  Left facial weakness: none    CN VIII   Hearing: intact    CN IX, X   Palate: symmetric  Right gag reflex: normal  Left gag reflex: normal    CN XI   Right sternocleidomastoid strength: normal  Left sternocleidomastoid strength: normal    CN XII   Tongue: not atrophic  Fasciculations: absent  Tongue deviation: none    Motor Exam   Muscle bulk: normal  Overall muscle tone: normal    Strength   Strength 5/5 throughout.     Sensory Exam   Light touch normal.     Gait, Coordination, and Reflexes     Gait  Gait: normal    Tremor   Resting tremor: absent  Intention tremor: absent  Action tremor: absent    Reflexes   Reflexes 2+ except as noted.      Result Review :   The following data was reviewed by: Duane Dalton MD on 05/10/2022:  Common labs    Common Labsle 11/8/21 11/8/21 11/8/21 11/8/21 2/16/22 2/16/22    0933 0933 0933 0933 0900 0900   Glucose 90    92    BUN 12    9    Creatinine 0.67    0.62    eGFR Non African Am >60    >60    eGFR  Am >60    >60    Sodium 141    139    Potassium 4.1    4.0    Chloride 102    101    Calcium 10.0    9.9    Albumin 4.7    4.6    Total Bilirubin 0.3    0.4    Alkaline Phosphatase 75    73    AST (SGOT) 20    20    ALT (SGPT) 19    20    WBC  5.56       Hemoglobin  14.0       Hematocrit  42.5       Platelets  276       Total Cholesterol    180  135   Triglycerides    114  107   HDL Cholesterol    41 (A)  44 (A)   LDL Cholesterol     116.2 (A)  69.6   Hemoglobin A1C   5.2      (A) Abnormal value       Comments are available for some flowsheets but are not being displayed.           Data reviewed: Radiologic studies MRI B/C          Assessment and Plan    Diagnoses and all orders for this visit:    1. Multiple sclerosis (HCC) (Primary)  Assessment & Plan:  Hold GA due to skin sclerosis     MRI Brain stable for last 4 years.       2. Periodic headache syndrome, not intractable  Assessment & Plan:  Headaches are improving with  lifestyle modifications.  Continue current treatment regimen.          3. Recurrent major depressive disorder, in partial remission (HCC)           Follow Up   No follow-ups on file.  Patient was given instructions and counseling regarding her condition or for health maintenance advice. Please see specific information pulled into the AVS if appropriate.

## 2022-05-16 ENCOUNTER — DOCUMENTATION (OUTPATIENT)
Dept: ONCOLOGY | Facility: HOSPITAL | Age: 67
End: 2022-05-16

## 2022-09-15 ENCOUNTER — DOCUMENTATION (OUTPATIENT)
Dept: ONCOLOGY | Facility: HOSPITAL | Age: 67
End: 2022-09-15

## 2022-10-06 ENCOUNTER — TELEPHONE (OUTPATIENT)
Dept: NEUROLOGY | Facility: CLINIC | Age: 67
End: 2022-10-06

## 2022-10-06 NOTE — TELEPHONE ENCOUNTER
Caller: Jagruti Morgan ASHER    Relationship: Self    Best call back number: [phone]    What orders are you requesting (i.e. lab or imaging): MRI     In what timeframe would the patient need to come in: ASAP    Where will you receive your lab/imaging services:      Additional notes: PATIENT TELEPHONED RE: ABRASION ON EYE WHEN SHE RECEIVED EYE INJECTION. PATIENTS ABRASION HAS HEALED, BUT HER EYES ARE STILL BLURRY. SPECIALIST ADVISED PATIENT TO WAIT  A FEW WEEKS TO SEE IF BLURRY VISION CLEARS. SHE WOULD LIKE AN MRI ORDER.    PLEASE WRITE &/OR CALL TO ADVISE

## 2022-10-06 NOTE — TELEPHONE ENCOUNTER
Notified Jagruti who states understanding. She will keep her November appt with him and see BJ early so we can get documentation in there for this event and possibly order an MRI.    Offered an appt this Monday and she will take it. Has her colonoscopy that morning but states her  is off that day so he can bring her later in the afternoon.

## 2022-10-10 ENCOUNTER — OFFICE VISIT (OUTPATIENT)
Dept: NEUROLOGY | Facility: CLINIC | Age: 67
End: 2022-10-10

## 2022-10-10 VITALS
HEART RATE: 82 BPM | TEMPERATURE: 96.9 F | BODY MASS INDEX: 27.27 KG/M2 | SYSTOLIC BLOOD PRESSURE: 148 MMHG | WEIGHT: 148.2 LBS | HEIGHT: 62 IN | OXYGEN SATURATION: 98 % | DIASTOLIC BLOOD PRESSURE: 98 MMHG

## 2022-10-10 DIAGNOSIS — G35 MULTIPLE SCLEROSIS: Primary | ICD-10-CM

## 2022-10-10 DIAGNOSIS — H53.8 BLURRED VISION, BILATERAL: ICD-10-CM

## 2022-10-10 PROBLEM — E03.9 ADULT HYPOTHYROIDISM: Status: ACTIVE | Noted: 2017-04-14

## 2022-10-10 PROBLEM — H43.812 POSTERIOR VITREOUS DETACHMENT OF LEFT EYE: Status: ACTIVE | Noted: 2021-10-26

## 2022-10-10 PROBLEM — M54.9 BACK PAIN: Status: ACTIVE | Noted: 2017-08-25

## 2022-10-10 PROBLEM — H47.20 OPTIC ATROPHY: Status: ACTIVE | Noted: 2021-10-26

## 2022-10-10 PROBLEM — H43.392 VITREOUS FLOATERS OF LEFT EYE: Status: ACTIVE | Noted: 2021-10-26

## 2022-10-10 PROBLEM — H04.123 DRY EYES: Status: ACTIVE | Noted: 2022-05-27

## 2022-10-10 PROBLEM — H34.8120 CENTRAL RETINAL VEIN OCCLUSION WITH MACULAR EDEMA OF LEFT EYE: Status: ACTIVE | Noted: 2021-11-01

## 2022-10-10 PROBLEM — H25.13 NUCLEAR SCLEROTIC CATARACT OF BOTH EYES: Status: ACTIVE | Noted: 2021-10-26

## 2022-10-10 PROBLEM — K59.09 CHRONIC CONSTIPATION: Status: ACTIVE | Noted: 2018-09-05

## 2022-10-10 PROCEDURE — 99214 OFFICE O/P EST MOD 30 MIN: CPT | Performed by: NURSE PRACTITIONER

## 2022-10-10 NOTE — PROGRESS NOTES
Neuro Office Visit      Encounter Date: 10/10/2022   Patient Name: Jagruti Morgan  : 1955   MRN: [id number]   PCP:  Annabel Martini MD     Chief Complaint:    Chief Complaint   Patient presents with   • Multiple Sclerosis       History of Present Illness: Jagruti Morgan is a 67 y.o. female who is here today in Neurology for blurred vision    Last visit with Dr. Dalton on 5/10/22 off Copaxone.     Blurred vision since she had an eye injection and had abrasion on her eye. The abrasion healed but she continues to have blurred vision.  Concerned that it may be related to MS. Blurred vision is always there and is worsened by bright lights. Floaters in left eye. No peripheral vision loss. Ophthalmologist not concerned and she was told that blurred vision should have resolved within 2 weeks.       Subjective          Past Medical History:   Past Medical History:   Diagnosis Date   • Abnormal heart rhythm    • Anxiety    • Depression    • Former smoker    • HL (hearing loss) Use hearing aids   • Hyperlipidemia    • Hypertension    • MS (multiple sclerosis) (HCC)    • PVC's (premature ventricular contractions)    • Shingles Had shingles 3/21    Had second vaccine on    • Stroke (HCC)     in her left eye   • Thyroid disorder    • Vision loss Optic neuritis       Past Surgical History:   Past Surgical History:   Procedure Laterality Date   •  SECTION     • NO PAST SURGERIES         Family History:   Family History   Problem Relation Age of Onset   • Alzheimer's disease Mother    • Dementia Mother          from alziehmier's   • Diabetes Mother    • Hypertension Father    • Heart attack Father    • Stroke Other    • Parkinsonism Paternal Uncle         Dianoised at 80   • Stroke Maternal Aunt    • Cancer Brother         bladder cancer       Social History:   Social History     Socioeconomic History   • Marital status:    Tobacco Use   • Smoking status: Former     Packs/day: 1.00     Years:  10.00     Pack years: 10.00     Types: Cigarettes   • Smokeless tobacco: Never   • Tobacco comments:     Quit 27 years ago   Vaping Use   • Vaping Use: Never used   Substance and Sexual Activity   • Alcohol use: Yes     Alcohol/week: 6.0 standard drinks     Types: 6 Glasses of wine per week     Comment: Wkly   • Drug use: No   • Sexual activity: Yes     Partners: Male     Birth control/protection: Post-menopausal       Medications:     Current Outpatient Medications:   •  aspirin 81 MG tablet, Take 81 mg by mouth Daily., Disp: , Rfl:   •  atorvastatin (LIPITOR) 40 MG tablet, Take 1 tablet by mouth Daily., Disp: , Rfl:   •  ipratropium (ATROVENT) 0.03 % nasal spray, 2 sprays into the nostril(s) as directed by provider Every 12 (Twelve) Hours., Disp: , Rfl:   •  levothyroxine (SYNTHROID, LEVOTHROID) 75 MCG tablet, Take 75 mcg by mouth Daily., Disp: , Rfl:   •  lisinopril-hydrochlorothiazide (PRINZIDE,ZESTORETIC) 10-12.5 MG per tablet, Take 1 tablet by mouth Daily., Disp: , Rfl:   •  metoprolol succinate XL (TOPROL-XL) 50 MG 24 hr tablet, Take 1 tablet by mouth Daily., Disp: , Rfl:   •  Propylene Glycol 0.6 % solution, Apply 1 drop to eye(s) as directed by provider 3 (Three) Times a Day., Disp: , Rfl:     Allergies:   No Known Allergies    PHQ-9 Total Score:     STEADI Fall Risk Assessment was completed, and patient is at LOW risk for falls.Assessment completed on:10/10/2022    Objective     Physical Exam:   Physical Exam  Vitals reviewed.   Eyes:      Extraocular Movements: EOM normal.      Pupils: Pupils are equal, round, and reactive to light.   Neurological:      Mental Status: She is oriented to person, place, and time.      Gait: Gait is intact.      Deep Tendon Reflexes:      Reflex Scores:       Tricep reflexes are 2+ on the right side and 2+ on the left side.       Bicep reflexes are 2+ on the right side and 2+ on the left side.       Brachioradialis reflexes are 2+ on the right side and 2+ on the left side.        Patellar reflexes are 2+ on the right side and 2+ on the left side.       Achilles reflexes are 2+ on the right side and 2+ on the left side.  Psychiatric:         Speech: Speech normal.         Neurologic Exam     Mental Status   Oriented to person, place, and time.   Attention: normal. Concentration: normal.   Speech: speech is normal     Cranial Nerves     CN II   Visual fields full to confrontation.     CN III, IV, VI   Pupils are equal, round, and reactive to light.  Extraocular motions are normal.   Right pupil: Reactivity: brisk. Consensual response: intact.   Left pupil: Reactivity: brisk. Consensual response: intact.   CN III: no CN III palsy  CN VI: no CN VI palsy  Nystagmus: none     CN V   Facial sensation intact.     CN VII   Facial expression full, symmetric.     CN VIII   CN VIII normal.     CN IX, X   CN IX normal.   CN X normal.     CN XI   CN XI normal.     CN XII   CN XII normal.     Motor Exam     Strength   Right neck flexion: 5/5  Left neck flexion: 5/5  Right neck extension: 5/5  Left neck extension: 5/5  Right deltoid: 5/5  Left deltoid: 5/5  Right biceps: 5/5  Left biceps: 5/5  Right triceps: 5/5  Left triceps: 5/5  Right wrist flexion: 5/5  Left wrist flexion: 5/5  Right wrist extension: 5/5  Left wrist extension: 5/5  Right interossei: 5/5  Left interossei: 5/5  Right abdominals: 5/5  Left abdominals: 5/5  Right iliopsoas: 5/5  Left iliopsoas: 5/5  Right quadriceps: 5/5  Left quadriceps: 5/5  Right hamstrin/5  Left hamstrin/5  Right glutei: 5/5  Left glutei: 5/5  Right anterior tibial: 5/5  Left anterior tibial: 5/5  Right posterior tibial: 5/5  Left posterior tibial: 5/5  Right peroneal: 5/5  Left peroneal: 5/5  Right gastroc: 5/5  Left gastroc: 5/5    Sensory Exam   Light touch normal.     Gait, Coordination, and Reflexes     Gait  Gait: normal    Reflexes   Right brachioradialis: 2+  Left brachioradialis: 2+  Right biceps: 2+  Left biceps: 2+  Right triceps: 2+  Left  "triceps: 2+  Right patellar: 2+  Left patellar: 2+  Right achilles: 2+  Left achilles: 2+  Right : 2+  Left : 2+       Vital Signs:   Vitals:    10/10/22 1528   BP: 148/98   Pulse: 82   Temp: 96.9 °F (36.1 °C)   SpO2: 98%   Weight: 67.2 kg (148 lb 3.2 oz)   Height: 157.5 cm (62.01\")     Body mass index is 27.1 kg/m².     Results:   Imaging:   No Images in the past 120 days found..     Labs:    No results found for: CMP, PROTEIN, ANTIMOGAB, [id number], JCVRESULT, QUANTTBGOLD, CBCDIF, IGGALBSER     Assessment / Plan      Assessment/Plan:   Diagnoses and all orders for this visit:    1. Multiple sclerosis (HCC) (Primary)  Comments:  MRI brain/c-spine with/without  Orders:  -     MRI Brain With & Without Contrast; Future  -     MRI Cervical Spine With & Without Contrast; Future    2. Blurred vision, bilateral  Comments:  MRI brain with/without           Patient Education:     Reviewed medications, potential side effects and signs and symptoms to report. Discussed risk versus benefits of treatment plan with patient and/or family-including medications, labs and radiology that may be ordered. Addressed questions and concerns during visit. Patient and/or family verbalized understanding and agree with plan. Instructed to call the office with any questions and report to ER with any life-threatening symptoms.     Follow Up:   Return for As scheduled with Dr. Dalton.    I spent 30  minutes face to face with the patient. I personally spent 50 percent of this time counseling and discussing diagnosis, diagnostic testing, driving, treatment options and management .       During this visit the following were done:  Labs Reviewed []    Labs Ordered []    Radiology Reports Reviewed []    Radiology Ordered [x]    PCP Records Reviewed []    Referring Provider Records Reviewed []    ER Records Reviewed []    Hospital Records Reviewed []    History Obtained From Family []    Radiology Images Reviewed []    Other Reviewed []  "   Records Requested []      MARLENE Jansen  [facility]  [address]  [place] [address] [phone]

## 2022-10-17 ENCOUNTER — TELEPHONE (OUTPATIENT)
Dept: NEUROLOGY | Facility: CLINIC | Age: 67
End: 2022-10-17

## 2022-10-17 NOTE — TELEPHONE ENCOUNTER
Caller: Jagruti Morgan    Relationship: Self    Best call back number: [phone]    What was the call regarding: PT SAW MARLENE MUÑOZ ON 10/10/22, MRI BRAIN & CSPINE ORDERED. PT WAS NOT ABLE TO GET MRIs SCHEDULED UNTIL 11/16/22. PT HAS F/U APPT W/ DR. ARRIAGA ON 11/15/22, THE DAY PRIOR. PT  WOULD LIKE TO KEEP HER APPT ON THE 15TH AS SHE HAS QUESTIONS SHE WOULD LIKE TO ADDRESS W/ DR. ARRIAGA.    PLEASE ADVISE IF PT IS OKAY TO KEEP F/U APPT THAT IS PRIOR TO HER MRI COMPLETION.    Do you require a callback: YES, PLEASE.    PLEASE REVIEW AND ADVISE.

## 2022-10-17 NOTE — TELEPHONE ENCOUNTER
Notified patient that she is good to keep her appointments like they are currently. She verbalized understanding.

## 2022-11-15 ENCOUNTER — OFFICE VISIT (OUTPATIENT)
Dept: NEUROLOGY | Facility: CLINIC | Age: 67
End: 2022-11-15

## 2022-11-15 VITALS
HEART RATE: 84 BPM | OXYGEN SATURATION: 98 % | DIASTOLIC BLOOD PRESSURE: 82 MMHG | BODY MASS INDEX: 27.23 KG/M2 | WEIGHT: 148 LBS | SYSTOLIC BLOOD PRESSURE: 128 MMHG | HEIGHT: 62 IN

## 2022-11-15 DIAGNOSIS — G35 MULTIPLE SCLEROSIS: Primary | Chronic | ICD-10-CM

## 2022-11-15 DIAGNOSIS — F33.41 RECURRENT MAJOR DEPRESSIVE DISORDER, IN PARTIAL REMISSION: Chronic | ICD-10-CM

## 2022-11-15 DIAGNOSIS — G43.C0 PERIODIC HEADACHE SYNDROME, NOT INTRACTABLE: Chronic | ICD-10-CM

## 2022-11-15 PROCEDURE — 99214 OFFICE O/P EST MOD 30 MIN: CPT | Performed by: PSYCHIATRY & NEUROLOGY

## 2022-11-15 NOTE — PROGRESS NOTES
"Chief Complaint    Multiple Sclerosis    Subjective        Jagrutiantony Morgan presents to [facility] NEUROLOGY [facility]     History of Present Illness    67 y.o. female returns in follow up.  Last visit on 10/10/22 ordered MRI B/C.      MRI Brain/cerivcal, 4/20/22 compared to 3/28/18 moderate to extensive T2 lesion load without new/enhancing/enalrging lesions, no cord lesions     MDD      Mood stable.      Migraines     Frequency is one day a week.   Located in top of head.  Quality sharp.  Brief.       RRMS     MRI B/C scheduled for tomorrow    Dx with CRVO OS    Less anxiety off meds.     MSFC normal    Exercising on a treadmill.       No new or worsening sx on Copaxone.         Heat intolerance causes fatigue.        Objective   Vital Signs:  /82   Pulse 84   Ht 157.5 cm (62.01\")   Wt 67.1 kg (148 lb)   SpO2 98%   BMI 27.06 kg/m²   Estimated body mass index is 27.06 kg/m² as calculated from the following:    Height as of this encounter: 157.5 cm (62.01\").    Weight as of this encounter: 67.1 kg (148 lb).          Physical Exam  Eyes:      Extraocular Movements: EOM normal.      Pupils: Pupils are equal, round, and reactive to light.   Neurological:      Mental Status: She is oriented to person, place, and time.      Cranial Nerves: Cranial nerves 2-12 are intact.      Motor: Motor strength is normal.      Gait: Gait is intact.   Psychiatric:         Speech: Speech normal.          Neurologic Exam     Mental Status   Oriented to person, place, and time.   Speech: speech is normal   Level of consciousness: alert  Knowledge: good and consistent with education.   Normal comprehension.     Cranial Nerves   Cranial nerves II through XII intact.     CN II   Visual fields full to confrontation.   Visual acuity: normal  Right visual field deficit: none  Left visual field deficit: none     CN III, IV, VI   Pupils are equal, round, and reactive to light.  Extraocular motions are normal. "   Nystagmus: none   Diplopia: none  Ophthalmoparesis: none  Upgaze: normal  Downgaze: normal  Conjugate gaze: present    CN V   Facial sensation intact.   Right corneal reflex: normal  Left corneal reflex: normal    CN VII   Right facial weakness: none  Left facial weakness: none    CN VIII   Hearing: intact    CN IX, X   Palate: symmetric  Right gag reflex: normal  Left gag reflex: normal    CN XI   Right sternocleidomastoid strength: normal  Left sternocleidomastoid strength: normal    CN XII   Tongue: not atrophic  Fasciculations: absent  Tongue deviation: none    Motor Exam   Muscle bulk: normal  Overall muscle tone: normal    Strength   Strength 5/5 throughout.     Sensory Exam   Light touch normal.     Gait, Coordination, and Reflexes     Gait  Gait: normal    Tremor   Resting tremor: absent  Intention tremor: absent  Action tremor: absent    Reflexes   Reflexes 2+ except as noted.      Result Review :  The following data was reviewed by: Duane Dalton MD on 11/15/2022:  Common labs    Common Labs 2/16/22 2/16/22 5/17/22 5/17/22 5/17/22 5/17/22 6/17/22    0900 0900 0819 0819 0819 0819    Glucose 92    98  135 (A)   BUN 9    10  10   Creatinine 0.62    0.66  0.66   eGFR Non African Am >60    >60  >60   eGFR  Am >60    >60  >60   Sodium 139    146 (A)  141   Potassium 4.0    4.3  4.3   Chloride 101    104  103   Calcium 9.9    10.5 (A)  9.7   Albumin 4.6    4.8  4.5   Total Bilirubin 0.4    0.4  0.3   Alkaline Phosphatase 73    78  72   AST (SGOT) 20    20  19   ALT (SGPT) 20    23  19   WBC   6.53       Hemoglobin   13.8       Hematocrit   42.7       Platelets   279       Total Cholesterol  135  156      Triglycerides  107  96      HDL Cholesterol  44 (A)  47 (A)      LDL Cholesterol   69.6  89.8      Hemoglobin A1C      5.3    (A) Abnormal value       Comments are available for some flowsheets but are not being displayed.                     Assessment and Plan   Diagnoses and all orders for this  visit:    1. Multiple sclerosis (HCC) (Primary)  Assessment & Plan:  MSFC normal    No complaints off DMT      2. Periodic headache syndrome, not intractable  Assessment & Plan:  Headaches are unchanged.  Continue current treatment regimen.          3. Recurrent major depressive disorder, in partial remission (HCC)           Follow Up   No follow-ups on file.  Patient was given instructions and counseling regarding her condition or for health maintenance advice. Please see specific information pulled into the AVS if appropriate.

## 2022-11-16 ENCOUNTER — HOSPITAL ENCOUNTER (OUTPATIENT)
Dept: MRI IMAGING | Facility: HOSPITAL | Age: 67
Discharge: HOME | End: 2022-11-16

## 2022-11-16 DIAGNOSIS — G35 MULTIPLE SCLEROSIS: ICD-10-CM

## 2022-11-16 LAB — CREAT BLDA-MCNC: 0.5 MG/DL (ref 0.6–1.3)

## 2022-11-16 PROCEDURE — A9577 INJ MULTIHANCE: HCPCS | Performed by: NURSE PRACTITIONER

## 2022-11-16 PROCEDURE — 0 GADOBENATE DIMEGLUMINE 529 MG/ML SOLUTION: Performed by: NURSE PRACTITIONER

## 2022-11-16 PROCEDURE — 72156 MRI NECK SPINE W/O & W/DYE: CPT

## 2022-11-16 PROCEDURE — 82565 ASSAY OF CREATININE: CPT

## 2022-11-16 PROCEDURE — 70553 MRI BRAIN STEM W/O & W/DYE: CPT

## 2022-11-16 RX ADMIN — GADOBENATE DIMEGLUMINE 13 ML: 529 INJECTION, SOLUTION INTRAVENOUS at 13:54

## 2022-11-22 ENCOUNTER — TELEPHONE (OUTPATIENT)
Dept: NEUROLOGY | Facility: CLINIC | Age: 67
End: 2022-11-22

## 2022-11-22 NOTE — TELEPHONE ENCOUNTER
Provider: LILIYA ARRIAGA MD    Caller: PAUL    Relationship to Patient: SELF    Phone Number: [phone]      Reason for Call:  PT SEEN HER MRI RESULTS IN MY CHART.  PT WOULD LIKE TO KNOW THE RESULTS.    Do you require a callback: YES PLEASE     PLEASE ADVISE

## 2023-11-09 ENCOUNTER — HOSPITAL ENCOUNTER (OUTPATIENT)
Dept: GENERAL RADIOLOGY | Facility: HOSPITAL | Age: 68
Discharge: HOME | End: 2023-11-09
Payer: COMMERCIAL

## 2023-11-09 ENCOUNTER — TRANSCRIBE ORDERS (OUTPATIENT)
Dept: ADMINISTRATIVE | Facility: HOSPITAL | Age: 68
End: 2023-11-09
Payer: COMMERCIAL

## 2023-11-09 DIAGNOSIS — M25.551 PAIN OF RIGHT HIP: Primary | ICD-10-CM

## 2023-11-16 ENCOUNTER — HOSPITAL ENCOUNTER (OUTPATIENT)
Dept: GENERAL RADIOLOGY | Facility: HOSPITAL | Age: 68
Discharge: HOME | End: 2023-11-16
Admitting: FAMILY MEDICINE
Payer: COMMERCIAL

## 2023-11-16 PROCEDURE — 73502 X-RAY EXAM HIP UNI 2-3 VIEWS: CPT

## 2023-11-28 NOTE — ASSESSMENT & PLAN NOTE
Chief Complaint   Patient presents with    Port Draw     Labs drawn via port by RN in lab. VS checked.     Labs drawn via Port accessed using 20 gauge flat needle. Line flushed and Heparin locked. Vital signs taken. Checked into next appointment.    Erica Baez RN      Headaches are improving with treatment.  Continue current treatment regimen.

## 2024-02-23 ENCOUNTER — OFFICE VISIT (OUTPATIENT)
Dept: NEUROLOGY | Facility: CLINIC | Age: 69
End: 2024-02-23
Payer: COMMERCIAL

## 2024-02-23 VITALS
WEIGHT: 149 LBS | DIASTOLIC BLOOD PRESSURE: 78 MMHG | BODY MASS INDEX: 27.42 KG/M2 | HEIGHT: 62 IN | HEART RATE: 83 BPM | SYSTOLIC BLOOD PRESSURE: 118 MMHG | OXYGEN SATURATION: 95 %

## 2024-02-23 DIAGNOSIS — G35 MULTIPLE SCLEROSIS: Primary | Chronic | ICD-10-CM

## 2024-02-23 DIAGNOSIS — F33.41 RECURRENT MAJOR DEPRESSIVE DISORDER, IN PARTIAL REMISSION: Chronic | ICD-10-CM

## 2024-02-23 DIAGNOSIS — G43.C0 PERIODIC HEADACHE SYNDROME, NOT INTRACTABLE: Chronic | ICD-10-CM

## 2024-02-23 PROCEDURE — 99214 OFFICE O/P EST MOD 30 MIN: CPT | Performed by: PSYCHIATRY & NEUROLOGY

## 2024-02-23 NOTE — PROGRESS NOTES
"Chief Complaint.    Multiple Sclerosis    Subjective        Jagruti Elif Morgan presents to [facility] NEUROLOGY  History of Present Illness    69 y.o. female returns in follow up.  Last visit on 11/15/22 continued off DMT.    MRI Brain/cerivcal, 4/20/22 compared to 3/28/18 moderate to extensive T2 lesion load without new/enhancing/enalrging lesions, no cord lesions     MDD      Mood stable.      Migraines     Frequency is one day a week.   Located in top of head.  Quality sharp.  Brief.       RRMS     Dx with CRVO OS     MSFC normal     Exercising on a treadmill.       No new or worsening sx on Copaxone.         Heat intolerance causes fatigue.        Objective   Vital Signs:  /78   Pulse 83   Ht 157.5 cm (62.01\")   Wt 67.6 kg (149 lb)   SpO2 95%   BMI 27.25 kg/m²   Estimated body mass index is 27.25 kg/m² as calculated from the following:    Height as of this encounter: 157.5 cm (62.01\").    Weight as of this encounter: 67.6 kg (149 lb).           Neurologic Exam     Mental Status   Oriented to person, place, and time.   Speech: speech is normal   Level of consciousness: alert  Knowledge: good and consistent with education.   Normal comprehension.     Cranial Nerves   Cranial nerves II through XII intact.     CN II   Visual fields full to confrontation.   Visual acuity: normal  Right visual field deficit: none  Left visual field deficit: none     CN III, IV, VI   Pupils are equal, round, and reactive to light.  Extraocular motions are normal.   Nystagmus: none   Diplopia: none  Ophthalmoparesis: none  Upgaze: normal  Downgaze: normal  Conjugate gaze: present    CN V   Facial sensation intact.   Right corneal reflex: normal  Left corneal reflex: normal    CN VII   Right facial weakness: none  Left facial weakness: none    CN VIII   Hearing: intact    CN IX, X   Palate: symmetric  Right gag reflex: normal  Left gag reflex: normal    CN XI   Right sternocleidomastoid strength: " normal  Left sternocleidomastoid strength: normal    CN XII   Tongue: not atrophic  Fasciculations: absent  Tongue deviation: none    Motor Exam   Muscle bulk: normal  Overall muscle tone: normal    Strength   Strength 5/5 throughout.     Sensory Exam   Light touch normal.     Gait, Coordination, and Reflexes     Gait  Gait: normal    Tremor   Resting tremor: absent  Intention tremor: absent  Action tremor: absent    Reflexes   Reflexes 2+ except as noted.        Physical Exam  Eyes:      Extraocular Movements: EOM normal.      Pupils: Pupils are equal, round, and reactive to light.   Neurological:      Mental Status: She is oriented to person, place, and time.      Cranial Nerves: Cranial nerves 2-12 are intact.      Motor: Motor strength is normal.     Gait: Gait is intact.   Psychiatric:         Speech: Speech normal.        Result Review :    The following data was reviewed by: Duane Dalton MD on 02/23/2024:  Common labs          3/10/2023    11:58   Common Labs   WBC 6.84       Hemoglobin 13.8       Hematocrit 41.1       Platelets 280          Details          This result is from an external source.                          Assessment and Plan     Diagnoses and all orders for this visit:    1. Multiple sclerosis (Primary)  Assessment & Plan:  Stable off DMT       2. Periodic headache syndrome, not intractable  Assessment & Plan:  Mild once a week                    3. Recurrent major depressive disorder, in partial remission  Assessment & Plan:  Stable off meds                 Follow Up     No follow-ups on file.  Patient was given instructions and counseling regarding her condition or for health maintenance advice. Please see specific information pulled into the AVS if appropriate.

## 2024-09-11 ENCOUNTER — TRANSCRIBE ORDERS (OUTPATIENT)
Dept: ADMINISTRATIVE | Facility: HOSPITAL | Age: 69
End: 2024-09-11
Payer: COMMERCIAL

## 2024-09-11 ENCOUNTER — HOSPITAL ENCOUNTER (OUTPATIENT)
Dept: GENERAL RADIOLOGY | Facility: HOSPITAL | Age: 69
Discharge: HOME | End: 2024-09-11
Admitting: FAMILY MEDICINE
Payer: COMMERCIAL

## 2024-09-11 DIAGNOSIS — M79.601 PAIN OF RIGHT UPPER EXTREMITY: Primary | ICD-10-CM

## 2024-09-11 PROCEDURE — 73030 X-RAY EXAM OF SHOULDER: CPT

## 2025-02-03 ENCOUNTER — TELEPHONE (OUTPATIENT)
Dept: NEUROLOGY | Facility: CLINIC | Age: 70
End: 2025-02-03

## 2025-02-03 NOTE — TELEPHONE ENCOUNTER
Provider: BART    Caller: Jagruti Morgan    Relationship to Patient: Self    Phone Number: [phone]    Reason for Call: PT HAD BEEN HAVING PAIN IN HER RIGHT ARM AND PCP ORDERED AN MRI OF SPINE THAT WAS PERFORMED ON 1/23/25 AT [facility].  SHE HAS THE IMAGES ON A DISK AND THE REPORT.  SHE WOULD LIKE FOR DR. ARRIAGA TO LOOK AT THIS.  SHE CAN DROP IT OFF BEFORE HER APPT IF NECESSARY.  SHE HAS AN APPT SCHEDULED ON 2/24/25, OFFICE WAS CLOSED WHEN THE CALL CAME IN SO COULD NOT ASK IF THIS NEEDED TO BE R/S.      PLEASE REVIEW AND ADVISE     THANK YOU

## 2025-02-12 ENCOUNTER — TELEPHONE (OUTPATIENT)
Dept: NEUROLOGY | Facility: CLINIC | Age: 70
End: 2025-02-12
Payer: MEDICARE

## 2025-02-12 NOTE — TELEPHONE ENCOUNTER
Provider: BART    Caller: Jagruti Morgan    Relationship to Patient: Self    Phone Number: [phone]    Reason for Call: PT HAD AN APPT ON 2/24/25 AND HAD TO BE R/S DUE TO PROVIDER OUT OF  OFFICE.  THE NEXT AVAILABLE IS NOT UNTIL 5/8/25.  PLEASE CALL PT IF SHE CAN BE WORKED IN SOONER    PLEASE REVIEW AND ADVISE     THANK YOU

## 2025-02-13 ENCOUNTER — OFFICE VISIT (OUTPATIENT)
Dept: NEUROLOGY | Facility: CLINIC | Age: 70
End: 2025-02-13
Payer: MEDICARE

## 2025-02-13 VITALS
OXYGEN SATURATION: 99 % | BODY MASS INDEX: 27.05 KG/M2 | WEIGHT: 147 LBS | DIASTOLIC BLOOD PRESSURE: 80 MMHG | HEIGHT: 62 IN | HEART RATE: 71 BPM | SYSTOLIC BLOOD PRESSURE: 124 MMHG

## 2025-02-13 DIAGNOSIS — F33.41 RECURRENT MAJOR DEPRESSIVE DISORDER, IN PARTIAL REMISSION: Chronic | ICD-10-CM

## 2025-02-13 DIAGNOSIS — G43.C0 PERIODIC HEADACHE SYNDROME, NOT INTRACTABLE: Chronic | ICD-10-CM

## 2025-02-13 DIAGNOSIS — G35 MULTIPLE SCLEROSIS: Primary | Chronic | ICD-10-CM

## 2025-02-13 PROCEDURE — 1160F RVW MEDS BY RX/DR IN RCRD: CPT | Performed by: PSYCHIATRY & NEUROLOGY

## 2025-02-13 PROCEDURE — 3079F DIAST BP 80-89 MM HG: CPT | Performed by: PSYCHIATRY & NEUROLOGY

## 2025-02-13 PROCEDURE — 3074F SYST BP LT 130 MM HG: CPT | Performed by: PSYCHIATRY & NEUROLOGY

## 2025-02-13 PROCEDURE — 1159F MED LIST DOCD IN RCRD: CPT | Performed by: PSYCHIATRY & NEUROLOGY

## 2025-02-13 PROCEDURE — 99213 OFFICE O/P EST LOW 20 MIN: CPT | Performed by: PSYCHIATRY & NEUROLOGY

## 2025-02-13 RX ORDER — TIZANIDINE 2 MG/1
2 TABLET ORAL
COMMUNITY
Start: 2025-01-03 | End: 2025-02-13

## 2025-02-13 RX ORDER — MUPIROCIN 20 MG/G
OINTMENT TOPICAL
COMMUNITY
Start: 2024-12-09 | End: 2025-02-13

## 2025-02-13 RX ORDER — IBUPROFEN 800 MG/1
TABLET, FILM COATED ORAL
COMMUNITY
Start: 2024-12-09 | End: 2025-02-13

## 2025-02-13 RX ORDER — FLUOCINONIDE 0.5 MG/G
OINTMENT TOPICAL
COMMUNITY
End: 2025-02-13

## 2025-02-13 NOTE — PROGRESS NOTES
"Chief Complaint  Multiple Sclerosis    Subjective        Jagruti Elif Morgan presents to [facility] NEUROLOGY  History of Present Illness    69 y.o. female returns in follow up.  Last visit on 2/23/24 continued off DMT.     MRI Cervical, my review of films, 1/23/25, mild C5-6 DDD, NFN, no cord lesions     MRI Brain/cerivcal, 4/20/22 compared to 3/28/18 moderate to extensive T2 lesion load without new/enhancing/enalrging lesions, no cord lesions     MDD      Mood stable.      Migraines     Frequency is once a month.   Located in top of head.  Quality sharp.  Brief.       IBU abort HA.     RRMS     Dx with CRVO OS     MSFC normal     Exercising on a treadmill.       No new or worsening sx.        Heat intolerance causes fatigue.        Objective   Vital Signs:  /80   Pulse 71   Ht 157.5 cm (62.01\")   Wt 66.7 kg (147 lb)   SpO2 99%   BMI 26.88 kg/m²   Estimated body mass index is 26.88 kg/m² as calculated from the following:    Height as of this encounter: 157.5 cm (62.01\").    Weight as of this encounter: 66.7 kg (147 lb).        Neurological Exam  Mental Status  Awake, alert and oriented to person, place and time. Oriented to person, place and time. Speech is normal. Language is fluent with no aphasia. Attention and concentration are normal. Fund of knowledge is appropriate for level of education.    Cranial Nerves  CN III, IV, VI: Extraocular movements intact bilaterally. Pupils equal round and reactive to light bilaterally.  CN V: Facial sensation is normal.  CN VII: Full and symmetric facial movement.  CN IX, X: Palate elevates symmetrically  CN XI: Shoulder shrug strength is normal.  CN XII: Tongue midline without atrophy or fasciculations.    Motor   Strength is 5/5 throughout all four extremities.    Sensory  Sensation is intact to light touch, pinprick, vibration and proprioception in all four extremities.    Reflexes  Deep tendon reflexes are 2+ and symmetric in all four " extremities.    Coordination    Finger-to-nose, rapid alternating movements and heel-to-shin normal bilaterally without dysmetria.    Gait  Normal casual, toe, heel and tandem gait.       Physical Exam  Eyes:      Extraocular Movements: Extraocular movements intact.      Pupils: Pupils are equal, round, and reactive to light.   Neurological:      Motor: Motor strength is normal.     Coordination: Coordination is intact.      Deep Tendon Reflexes: Reflexes are normal and symmetric.   Psychiatric:         Speech: Speech normal.        Result Review :  The following data was reviewed by: Duane Dalton MD on 02/13/2025:  Common labs          5/8/2024    16:22 9/11/2024    11:39   Common Labs   WBC 7.76     6.28       Hemoglobin 13.5     13.7       Hematocrit 39.9     41.6       Platelets 313     311          Details          This result is from an external source.             Data reviewed : Radiologic studies MRI Cervical            Assessment and Plan   Diagnoses and all orders for this visit:    1. Multiple sclerosis (Primary)  Assessment & Plan:  No new lesion observed on review of MRI Cervical     Continue DMT       2. Periodic headache syndrome, not intractable    3. Recurrent major depressive disorder, in partial remission  Assessment & Plan:  Mood is good off meds.                Follow Up   No follow-ups on file.  Patient was given instructions and counseling regarding her condition or for health maintenance advice. Please see specific information pulled into the AVS if appropriate.

## 2025-02-14 ENCOUNTER — PATIENT ROUNDING (BHMG ONLY) (OUTPATIENT)
Dept: NEUROLOGY | Facility: CLINIC | Age: 70
End: 2025-02-14
Payer: MEDICARE

## 2025-02-28 ENCOUNTER — TRANSCRIBE ORDERS (OUTPATIENT)
Dept: ADMINISTRATIVE | Facility: HOSPITAL | Age: 70
End: 2025-02-28
Payer: MEDICARE

## 2025-02-28 DIAGNOSIS — G89.29 CHRONIC RIGHT SHOULDER PAIN: Primary | ICD-10-CM

## 2025-02-28 DIAGNOSIS — M25.511 CHRONIC RIGHT SHOULDER PAIN: Primary | ICD-10-CM

## 2025-03-20 ENCOUNTER — HOSPITAL ENCOUNTER (OUTPATIENT)
Dept: MRI IMAGING | Facility: HOSPITAL | Age: 70
Discharge: HOME | End: 2025-03-20
Admitting: FAMILY MEDICINE
Payer: MEDICARE

## 2025-03-20 DIAGNOSIS — G89.29 CHRONIC RIGHT SHOULDER PAIN: ICD-10-CM

## 2025-03-20 DIAGNOSIS — M25.511 CHRONIC RIGHT SHOULDER PAIN: ICD-10-CM

## 2025-03-20 PROCEDURE — 73221 MRI JOINT UPR EXTREM W/O DYE: CPT

## 2025-03-27 ENCOUNTER — LAB (OUTPATIENT)
Dept: LAB | Facility: HOSPITAL | Age: 70
End: 2025-03-27
Payer: MEDICARE

## 2025-03-27 ENCOUNTER — TRANSCRIBE ORDERS (OUTPATIENT)
Dept: LAB | Facility: HOSPITAL | Age: 70
End: 2025-03-27
Payer: MEDICARE

## 2025-03-27 DIAGNOSIS — M25.511 RIGHT SHOULDER PAIN, UNSPECIFIED CHRONICITY: ICD-10-CM

## 2025-03-27 DIAGNOSIS — M25.511 RIGHT SHOULDER PAIN, UNSPECIFIED CHRONICITY: Primary | ICD-10-CM

## 2025-03-27 LAB
CRP SERPL-MCNC: <0.3 MG/DL (ref 0–0.5)
DEPRECATED RDW RBC AUTO: 41.3 FL (ref 37–54)
ERYTHROCYTE [DISTWIDTH] IN BLOOD BY AUTOMATED COUNT: 12.6 % (ref 12.3–15.4)
ERYTHROCYTE [SEDIMENTATION RATE] IN BLOOD: 8 MM/HR (ref 0–30)
HCT VFR BLD AUTO: 42.6 % (ref 34–46.6)
HGB BLD-MCNC: 14.3 G/DL (ref 12–15.9)
MCH RBC QN AUTO: 30.4 PG (ref 26.6–33)
MCHC RBC AUTO-ENTMCNC: 33.6 G/DL (ref 31.5–35.7)
MCV RBC AUTO: 90.6 FL (ref 79–97)
PLATELET # BLD AUTO: 303 10*3/MM3 (ref 140–450)
PMV BLD AUTO: 9.6 FL (ref 6–12)
RBC # BLD AUTO: 4.7 10*6/MM3 (ref 3.77–5.28)
WBC NRBC COR # BLD AUTO: 6.77 10*3/MM3 (ref 3.4–10.8)

## 2025-03-27 PROCEDURE — 85027 COMPLETE CBC AUTOMATED: CPT

## 2025-03-27 PROCEDURE — 36415 COLL VENOUS BLD VENIPUNCTURE: CPT

## 2025-03-27 PROCEDURE — 86140 C-REACTIVE PROTEIN: CPT

## 2025-03-27 PROCEDURE — 85652 RBC SED RATE AUTOMATED: CPT
